# Patient Record
Sex: FEMALE | Race: WHITE | HISPANIC OR LATINO | Employment: OTHER | URBAN - METROPOLITAN AREA
[De-identification: names, ages, dates, MRNs, and addresses within clinical notes are randomized per-mention and may not be internally consistent; named-entity substitution may affect disease eponyms.]

---

## 2017-01-11 ENCOUNTER — APPOINTMENT (OUTPATIENT)
Dept: RADIOLOGY | Facility: MEDICAL CENTER | Age: 62
End: 2017-01-11
Payer: COMMERCIAL

## 2017-01-11 ENCOUNTER — APPOINTMENT (OUTPATIENT)
Dept: RADIOLOGY | Facility: MEDICAL CENTER | Age: 62
End: 2017-01-11
Attending: PHYSICIAN ASSISTANT
Payer: COMMERCIAL

## 2017-11-16 ENCOUNTER — OFFICE VISIT (OUTPATIENT)
Dept: URGENT CARE | Facility: PHYSICIAN GROUP | Age: 62
End: 2017-11-16
Payer: COMMERCIAL

## 2017-11-16 ENCOUNTER — HOSPITAL ENCOUNTER (OUTPATIENT)
Dept: RADIOLOGY | Facility: MEDICAL CENTER | Age: 62
End: 2017-11-16
Attending: NURSE PRACTITIONER
Payer: COMMERCIAL

## 2017-11-16 VITALS
TEMPERATURE: 98.5 F | HEIGHT: 64 IN | RESPIRATION RATE: 16 BRPM | SYSTOLIC BLOOD PRESSURE: 124 MMHG | HEART RATE: 79 BPM | OXYGEN SATURATION: 97 % | WEIGHT: 188 LBS | BODY MASS INDEX: 32.1 KG/M2 | DIASTOLIC BLOOD PRESSURE: 76 MMHG
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
## 2017-11-16 DIAGNOSIS — S69.91XA INJURY OF RIGHT WRIST, INITIAL ENCOUNTER: ICD-10-CM

## 2017-11-16 PROCEDURE — 73110 X-RAY EXAM OF WRIST: CPT | Mod: RT

## 2017-11-16 PROCEDURE — 99213 OFFICE O/P EST LOW 20 MIN: CPT | Performed by: NURSE PRACTITIONER

## 2017-11-16 ASSESSMENT — ENCOUNTER SYMPTOMS
SENSORY CHANGE: 0
CHILLS: 0
FEVER: 0
MYALGIAS: 1
WEAKNESS: 0
TINGLING: 0
FALLS: 1

## 2017-11-16 NOTE — PROGRESS NOTES
Subjective:      Giorgi Rodriguez is a 62 y.o. female who presents with Wrist Injury (right wrist pain and welling, slipped on water x today not work related)            HPI  Giorgi slipped and fell at restaurant 2 hrs ago due to rain. Posted area with sign of caution of 'slippery when wet''. No previous injury to right wrist but has had right shoulder injury from slip and fall. Denies numbness/tingling. Able to move all fingers and make a fist. Ice application and took 800 mg Ibuprofen at time of incident. Denies hitting head.    PMH:  has a past medical history of Arthritis; Heart abnormality; Heart burn; Hypertension; Infectious disease (2016); Insulin resistance (2016); Paralysis (CMS-HCC) (2016); PFO (patent foramen ovale); and Stroke (CMS-HCC) (2012).  MEDS:   Current Outpatient Prescriptions:   •  losartan (COZAAR) 100 MG Tab, Take 1 Tab by mouth every day., Disp: 90 Each, Rfl: 3  •  amlodipine (NORVASC) 10 MG Tab, Take 10 mg by mouth every day., Disp: , Rfl:   •  metformin (GLUCOPHAGE) 500 MG Tab, Take 500 mg by mouth every day., Disp: , Rfl:   •  ALOE VERA JUICE PO, Take  by mouth., Disp: , Rfl:   •  aspirin (ASA) 325 MG TABS, Take 325 mg by mouth every day., Disp: , Rfl:   •  Dulaglutide 1.5 MG/0.5ML Solution Pen-injector, Inject  as instructed every 7 days., Disp: , Rfl:   ALLERGIES:   Allergies   Allergen Reactions   • Codeine      vomit     SURGHX:   Past Surgical History:   Procedure Laterality Date   • SHOULDER ARTHROSCOPY W/ ROTATOR CUFF REPAIR Right 2/29/2016    Procedure: SHOULDER ARTHROSCOPY W/ ROTATOR CUFF REPAIR  W/DEBRIDEMENT;  Surgeon: Dejon Lawton M.D.;  Location: SURGERY [facility];  Service:    • SHOULDER DECOMPRESSION ARTHROSCOPIC Right 2/29/2016    Procedure: SHOULDER DECOMPRESSION ARTHROSCOPIC - SUBACROMIAL biceps tenotomy;  Surgeon: Dejon Lawton M.D.;  Location: [facility];  Service:    • RECOVERY  8/3/2012    Performed by MATEUSZ FERGUSON at  "SURGERY SAME DAY [facility] ORS   • ABDOMINAL HYSTERECTOMY TOTAL  1992    total abdominal hysterectomy   • BREAST BIOPSY  1992    hx of benign left breast bx.   • PRIMARY C SECTION  1986   • APPENDECTOMY  1969     SOCHX:  reports that she has never smoked. She has never used smokeless tobacco. She reports that she drinks alcohol. She reports that she does not use drugs.  FH: Family history was reviewed, no pertinent findings to report    Review of Systems   Constitutional: Negative for chills, fever and malaise/fatigue.   Musculoskeletal: Positive for falls, joint pain and myalgias.   Neurological: Negative for tingling, sensory change and weakness.   All other systems reviewed and are negative.         Objective:     /76   Pulse 79   Temp 36.9 °C (98.5 °F)   Resp 16   Ht 1.626 m (5' 4\")   Wt 85.3 kg (188 lb)   SpO2 97%   BMI 32.27 kg/m²      Physical Exam   Constitutional: She is oriented to person, place, and time. Vital signs are normal. She appears well-developed and well-nourished. She is active and cooperative.  Non-toxic appearance. She does not have a sickly appearance. She does not appear ill. No distress.   HENT:   Head: Normocephalic.   Eyes: Conjunctivae and EOM are normal. Pupils are equal, round, and reactive to light.   Neck: Normal range of motion.   Cardiovascular: Normal rate.    Pulmonary/Chest: Effort normal.   Musculoskeletal:        Right wrist: She exhibits decreased range of motion, tenderness, bony tenderness and swelling. She exhibits no effusion, no crepitus and no deformity.        Right forearm: She exhibits tenderness. She exhibits no bony tenderness, no swelling, no edema, no deformity and no laceration.        Arms:  Minimal swelling of right hand/wrist, TTP at base of right thumb and 1st digit metacarpal bones of dorsal aspect. Decreased ROM with upward flexion at this site as well as the ventral aspect of midline wrist joint. Some TTP of right forearm without swelling or " deformity.   Neurological: She is alert and oriented to person, place, and time.   Skin: Skin is warm and dry. She is not diaphoretic.   Vitals reviewed.    Right wrist xray:    FINDINGS:  No acute fracture or subluxation is seen.  Smoothly contoured 3 mm calcification overlies the dorsal carpus  Normal carpal relationships     MA applied velcro wrist splint  Assessment/Plan:     1. Injury of right wrist, initial encounter    - DX-WRIST-COMPLETE 3+ RIGHT; Future    2. Fall on same level from slipping, initial encounter    - DX-WRIST-COMPLETE 3+ RIGHT; Future    May take Ibuprofen prn for pain/swelling  May use cool compresses for swelling prn  May utilize RICE method prn  Avoid excessive weight lifting until muscle soreness in right hand/wrist decreases  May apply topical analgesics prn  Perform proper body mechanics with lifting, twisting, bending and reaching. Ask for assistance with heavy objects prn  Monitor for deformity, numbness/tingling in fingers, decreased ROM with lifting difficulty- need re-evaluation

## 2018-02-25 ENCOUNTER — TELEMEDICINE2 (OUTPATIENT)
Dept: URGENT CARE | Facility: PHYSICIAN GROUP | Age: 63
End: 2018-02-25

## 2018-02-25 VITALS — WEIGHT: 180 LBS | HEIGHT: 64 IN | BODY MASS INDEX: 30.73 KG/M2

## 2018-02-25 DIAGNOSIS — R05.3 CHRONIC COUGH: ICD-10-CM

## 2018-02-25 DIAGNOSIS — J06.9 URI WITH COUGH AND CONGESTION: ICD-10-CM

## 2018-02-25 PROCEDURE — 99214 OFFICE O/P EST MOD 30 MIN: CPT | Performed by: FAMILY MEDICINE

## 2018-02-25 RX ORDER — BENZONATATE 200 MG/1
200 CAPSULE ORAL 3 TIMES DAILY PRN
Qty: 30 CAP | Refills: 0 | Status: SHIPPED | OUTPATIENT
Start: 2018-02-25 | End: 2018-09-18

## 2018-02-28 NOTE — PROGRESS NOTES
"Chief Complaint   Patient presents with   • Cough     deep cough been taking over the counter medication not getting beter      CC:  cough        Cough  This is a new problem. The current episode started 2-3 wks ago. The problem has been unchanged. The problem occurs constantly. The cough is dry. Associated symptoms include : fatigue, but she denies any muscle aches, fever. Pertinent negatives include no   headaches, nausea, vomiting, diarrhea, sweats, weight loss or wheezing. Nothing aggravates the symptoms.  Patient has tried several OTC medications for the symptoms without relief. There is no history of asthma.        Past Medical History:   Diagnosis Date   • Infectious disease 2016    bronchitis   • Paralysis (CMS-HCC) 2016    facial paralysis x 3, Pt states\" may be related to possible lupus\"   • Insulin resistance 2016   • Stroke (CMS-HCC) 2012    TIA   • Arthritis    • Heart abnormality     patent foramen ovale   • Heart burn     joints, neck (buldging discs)   • Hypertension    • PFO (patent foramen ovale)          Social History   Substance Use Topics   • Smoking status: Never Smoker   • Smokeless tobacco: Never Used   • Alcohol use Yes      Comment: 1-2 glasses of wine 3-4 times a week         Current Outpatient Prescriptions on File Prior to Visit   Medication Sig Dispense Refill   • losartan (COZAAR) 100 MG Tab Take 1 Tab by mouth every day. 90 Each 3   • amlodipine (NORVASC) 10 MG Tab Take 10 mg by mouth every day.     • metformin (GLUCOPHAGE) 500 MG Tab Take 500 mg by mouth every day.     • ALOE VERA JUICE PO Take  by mouth.     • aspirin (ASA) 325 MG TABS Take 325 mg by mouth every day.     • Dulaglutide 1.5 MG/0.5ML Solution Pen-injector Inject  as instructed every 7 days.       No current facility-administered medications on file prior to visit.                     Review of Systems   Constitutional: Negative for fever and weight loss.   HENT: negative for otalgia  Cardiovascular - denies chest pain " "or dyspnea  Respiratory: Positive for cough.  .  Negative for wheezing.    Neurological: Negative for headaches.   GI - denies nausea, vomiting or diarrhea  Neuro - denies numbness or tingling.            Objective:     Height 1.626 m (5' 4\"), weight 81.6 kg (180 lb).    Physical Exam   Constitutional: patient is oriented to person, place, and time. Patient appears well-developed and well-nourished. No distress.      Psychiatric: patient  has a normal mood and affect.  behavior is normal.   Nursing note and vitals reviewed.              Assessment/Plan:       1.  cough   likely secondary to URI, but I advised the pt that given the limitations of the virtual visit, I advised her to come in to the clinic for evaluation.     She declined to come in, therefore will prescribe tessalon and reiterated that she should come to the clinic if sx are not improved in 3-4 days.       - benzonatate (TESSALON) 200 MG capsule; Take 1 Cap by mouth 3 times a day as needed for Cough.  Dispense: 30 Cap; Refill: 0     "

## 2018-05-25 DIAGNOSIS — I10 ESSENTIAL HYPERTENSION: ICD-10-CM

## 2018-05-25 RX ORDER — LOSARTAN POTASSIUM 100 MG/1
100 TABLET ORAL DAILY
Qty: 30 TAB | Refills: 0 | Status: SHIPPED | OUTPATIENT
Start: 2018-05-25 | End: 2018-06-20 | Stop reason: SDUPTHER

## 2018-05-25 NOTE — TELEPHONE ENCOUNTER
Adri Burkett, Med Ass't  Missy Nguyen R.N.             Okay I will prep it    Previous Messages      ----- Message -----   From: Missy Nguyen R.N.   Sent: 5/25/2018   2:26 PM   To: Adri Burkett Med Ass't   Subject: RE: Patient out of prescription for Losartan     Will send 30 day and will defer to PCP after that.     Thanks!     ----- Message -----   From: Adri Burkett Med Ass't   Sent: 5/25/2018   2:23 PM   To: Missy Nguyen R.N.   Subject: FW: Patient out of prescription for Losartan     LA's last notes state:   Return if symptoms worsen or fail to improve.   That was back in 2016     Would it be okay for me to inform the patient to get refills from PCP or okay to fill under LA?     ----- Message -----   From: Precious Henriquez   Sent: 5/25/2018   9:59 AM   To: Adri Burkett Med Ass't   Subject: Patient out of prescription for Losartan         Adri,     This is a patient of Dr Ai Conner's. She has been out of her prescription for Losartan 100 mg since Sunday and needs it called into [facility] in [place].

## 2018-08-23 ENCOUNTER — TELEPHONE (OUTPATIENT)
Dept: CARDIOLOGY | Facility: MEDICAL CENTER | Age: 63
End: 2018-08-23

## 2018-08-23 DIAGNOSIS — I10 ESSENTIAL HYPERTENSION: ICD-10-CM

## 2018-08-23 RX ORDER — LOSARTAN POTASSIUM 100 MG/1
100 TABLET ORAL DAILY
Qty: 30 TAB | Refills: 0 | Status: SHIPPED | OUTPATIENT
Start: 2018-08-23 | End: 2018-09-19 | Stop reason: SDUPTHER

## 2018-08-23 NOTE — TELEPHONE ENCOUNTER
"----- Message from Adri Burkett, Med Ass't sent at 8/23/2018  8:30 AM PDT -----  Regarding: refill request  Patient called requesting her losartan however her last appt in 2016 stated \"Return if symptoms worsen or fail to improve\"  So should patient continue to get her refills from the PCP? Or fill under cardiologist? Please call patient back to clarify  Thank you    ==========================================================================    Noted that pt is already scheduled to see Dr Conner 9/18/18.     Refill Rx for Losartan sent to [facility]                     "

## 2018-09-10 DIAGNOSIS — I10 ESSENTIAL HYPERTENSION: ICD-10-CM

## 2018-09-11 RX ORDER — AMLODIPINE BESYLATE 10 MG/1
10 TABLET ORAL DAILY
Qty: 30 TAB | Refills: 0 | OUTPATIENT
Start: 2018-09-11

## 2018-09-11 NOTE — TELEPHONE ENCOUNTER
Ai Conner M.D.  Missy Nguyen RNithinN.   Caller: Unspecified (Yesterday,  4:59 PM)             Hmm   I think I last saw her 11/2016   I cant refill these..     Let me know if that is a problem.      Attempted to call pt to notify, no answer, detailed vm left regarding above

## 2018-09-18 ENCOUNTER — OFFICE VISIT (OUTPATIENT)
Dept: CARDIOLOGY | Facility: MEDICAL CENTER | Age: 63
End: 2018-09-18
Payer: COMMERCIAL

## 2018-09-18 DIAGNOSIS — Q21.12 PFO (PATENT FORAMEN OVALE): ICD-10-CM

## 2018-09-18 PROCEDURE — 99214 OFFICE O/P EST MOD 30 MIN: CPT | Performed by: INTERNAL MEDICINE

## 2018-09-18 ASSESSMENT — ENCOUNTER SYMPTOMS
COUGH: 0
MEMORY LOSS: 0
HEARTBURN: 0
ABDOMINAL PAIN: 0
WEIGHT LOSS: 0
SPUTUM PRODUCTION: 0
DIZZINESS: 0
NAUSEA: 0
ORTHOPNEA: 0
SHORTNESS OF BREATH: 0
WHEEZING: 0

## 2018-09-19 ENCOUNTER — TELEPHONE (OUTPATIENT)
Dept: CARDIOLOGY | Facility: MEDICAL CENTER | Age: 63
End: 2018-09-19

## 2018-09-19 DIAGNOSIS — I10 ESSENTIAL HYPERTENSION: ICD-10-CM

## 2018-09-19 DIAGNOSIS — Q21.12 PFO (PATENT FORAMEN OVALE): ICD-10-CM

## 2018-09-19 DIAGNOSIS — E10.9 TYPE 1 DIABETES MELLITUS WITHOUT COMPLICATION (HCC): ICD-10-CM

## 2018-09-19 RX ORDER — LOSARTAN POTASSIUM 100 MG/1
100 TABLET ORAL DAILY
Qty: 90 TAB | Refills: 3 | Status: SHIPPED | OUTPATIENT
Start: 2018-09-19 | End: 2019-10-12 | Stop reason: SDUPTHER

## 2018-09-19 NOTE — TELEPHONE ENCOUNTER
Ai Conner M.D.  Missy Nguyen R.N.             The injectable     Thx!!    Previous Messages      ----- Message -----   From: Missy Nguyen R.N.   Sent: 9/18/2018   5:12 PM   To: Ai Conner M.D.     Hi Dr Conner,     Did you mean the Metformin or other meds?     Thank You!     ----- Message -----   From: Ai Conner M.D.   Sent: 9/18/2018   4:34 PM   To: Missy Nguyen R.N.     Can you help me renew her diabetes medicine?  She needs about 6 months worth, find by me I just saw her, I just could not figure out how to do it.  [facility]           Refill Rx for Dulaglutide sent to "[facility] pharm

## 2018-09-19 NOTE — PROGRESS NOTES
"Subjective:   Giorgi Rodriguez is a 61 y.o. female who presents today in follow-up also has hypertension in regards to her history of strokelike symptoms in 2012 with a noted PFO.    Was in [place] at a golf match.  Had a few seconds where she lost control of one side of her face.  Transient.  Some confusion.  Thinks she might have thrown a clot.  Reminded her of 2012.  Did not call us did not seek ER help back to normal.       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  No limitations in activity.  Compliant on her medication        Past Medical History:   Diagnosis Date   • Arthritis    • Heart abnormality     patent foramen ovale   • Heart burn     joints, neck (buldging discs)   • Hypertension    • Infectious disease 2016    bronchitis   • Insulin resistance 2016   • Paralysis (HCC) 2016    facial paralysis x 3, Pt states\" may be related to possible lupus\"   • PFO (patent foramen ovale)    • Stroke (HCC) 2012    TIA     Past Surgical History:   Procedure Laterality Date   • SHOULDER ARTHROSCOPY W/ ROTATOR CUFF REPAIR Right 2/29/2016    Procedure: SHOULDER ARTHROSCOPY W/ ROTATOR CUFF REPAIR  W/DEBRIDEMENT;  Surgeon: Dejon Lawton M.D.;  Location: [facility];  Service:    • SHOULDER DECOMPRESSION ARTHROSCOPIC Right 2/29/2016    Procedure: SHOULDER DECOMPRESSION ARTHROSCOPIC - SUBACROMIAL biceps tenotomy;  Surgeon: Dejon Lawton M.D.;  Location: [facility];  Service:    • RECOVERY  8/3/2012    Performed by MATEUSZ FERGUSON at SURGERY SAME DAY [facility] ORS   • ABDOMINAL HYSTERECTOMY TOTAL  1992    total abdominal hysterectomy   • BREAST BIOPSY  1992    hx of benign left breast bx.   • PRIMARY C SECTION  1986   • APPENDECTOMY  1969     Family History   Problem Relation Age of Onset   • Clotting Disorder Mother    • Heart Attack Father    • Heart Disease Father    • Lung Disease Father    • Cancer Paternal Aunt         ovarian ca   • Cancer " Paternal Grandmother         cervical   • Cancer Paternal Aunt         ovarian ca     History   Smoking Status   • Never Smoker   Smokeless Tobacco   • Never Used     Allergies   Allergen Reactions   • Codeine      vomit     Outpatient Encounter Prescriptions as of 9/18/2018   Medication Sig Dispense Refill   • losartan (COZAAR) 100 MG Tab Take 1 Tab by mouth every day. Please contact PCP for further refills. 30 Tab 0   • metformin (GLUCOPHAGE) 500 MG Tab Take 500 mg by mouth every day.     • ALOE VERA JUICE PO Take  by mouth.     • aspirin (ASA) 325 MG TABS Take 325 mg by mouth every day.     • [DISCONTINUED] benzonatate (TESSALON) 200 MG capsule Take 1 Cap by mouth 3 times a day as needed for Cough. (Patient not taking: Reported on 9/18/2018) 30 Cap 0   • Dulaglutide 1.5 MG/0.5ML Solution Pen-injector Inject  as instructed every 7 days.     • amlodipine (NORVASC) 10 MG Tab Take 10 mg by mouth every day.       No facility-administered encounter medications on file as of 9/18/2018.      Review of Systems   Constitutional: Negative for malaise/fatigue and weight loss.   Respiratory: Negative for cough, sputum production, shortness of breath and wheezing.    Cardiovascular: Negative for chest pain and orthopnea.   Gastrointestinal: Negative for abdominal pain, heartburn and nausea.   Neurological: Negative for dizziness.   Psychiatric/Behavioral: Negative for memory loss.   All other systems reviewed and are negative.       Objective:   There were no vitals taken for this visit.    Physical Exam   Constitutional: She is oriented to person, place, and time. She appears well-developed and well-nourished.   Patient seen and examined again today, changes are noted   HENT:   Head: Normocephalic and atraumatic.   Mouth/Throat: Mucous membranes are normal.   Eyes: Pupils are equal, round, and reactive to light. EOM are normal.   Neck: No JVD present. No thyroid mass and no thyromegaly present.   Cardiovascular: Normal rate  and regular rhythm.  Exam reveals no gallop.    No murmur heard.  Pulmonary/Chest: Effort normal and breath sounds normal. She exhibits no tenderness.   Abdominal: Bowel sounds are normal. She exhibits no distension.   Musculoskeletal: Normal range of motion. She exhibits no edema or tenderness.   Neurological: She is alert and oriented to person, place, and time. She has normal strength. She displays no tremor. No cranial nerve deficit.   Skin: Skin is warm and dry. No rash noted.   Psychiatric: She has a normal mood and affect. Her behavior is normal.   Vitals reviewed.      Assessment:     1. PFO (patent foramen ovale), noted on echo 2012  Echocardiogram Comp w/o Cont       Medical Decision Making:  Today's Assessment / Status / Plan:     Strokelike symptoms  We talked about getting an MRI or emergency procedures.  She is not interested and understands risks  Continue aspirin  Repeat echo to look at right heart, I do not think we need a bubble study if she has significant problems again, I would recommend  An MRI and anticoagulation until proven otherwise    I would also recommend her seeing my partner in regards to closure    In addition, her primary care just left the area.  We talked about diabetes and I took the liberty of renewing her medications for her    She voices understanding she is aware of the risks.  We talked at length about the complexity of this problem and follow-up    Physical Exam   Constitutional: She is oriented to person, place, and time. She appears well-developed and well-nourished.   Patient seen and examined again today, changes are noted   HENT:   Head: Normocephalic and atraumatic.   Mouth/Throat: Mucous membranes are normal.   Eyes: Pupils are equal, round, and reactive to light. EOM are normal.   Neck: No JVD present. No thyroid mass and no thyromegaly present.   Cardiovascular: Normal rate and regular rhythm.  Exam reveals no gallop.    No murmur heard.  Pulmonary/Chest: Effort  normal and breath sounds normal. She exhibits no tenderness.   Abdominal: Bowel sounds are normal. She exhibits no distension.   Musculoskeletal: Normal range of motion. She exhibits no edema or tenderness.   Neurological: She is alert and oriented to person, place, and time. She has normal strength. She displays no tremor. No cranial nerve deficit.   Skin: Skin is warm and dry. No rash noted.   Psychiatric: She has a normal mood and affect. Her behavior is normal.   Vitals reviewed.

## 2018-09-28 ENCOUNTER — APPOINTMENT (OUTPATIENT)
Dept: INTERNAL MEDICINE | Facility: MEDICAL CENTER | Age: 63
End: 2018-09-28
Payer: COMMERCIAL

## 2018-10-25 ENCOUNTER — HOSPITAL ENCOUNTER (OUTPATIENT)
Dept: CARDIOLOGY | Facility: MEDICAL CENTER | Age: 63
End: 2018-10-25
Attending: INTERNAL MEDICINE
Payer: COMMERCIAL

## 2018-10-25 DIAGNOSIS — Q21.12 PFO (PATENT FORAMEN OVALE): ICD-10-CM

## 2018-10-25 PROCEDURE — 93306 TTE W/DOPPLER COMPLETE: CPT

## 2018-10-25 PROCEDURE — 93306 TTE W/DOPPLER COMPLETE: CPT | Mod: 26 | Performed by: INTERNAL MEDICINE

## 2018-10-26 LAB
LV EJECT FRACT  99904: 65
LV EJECT FRACT MOD 2C 99903: 69.96
LV EJECT FRACT MOD 4C 99902: 68.56
LV EJECT FRACT MOD BP 99901: 69.86

## 2018-10-29 ENCOUNTER — TELEPHONE (OUTPATIENT)
Dept: CARDIOLOGY | Facility: MEDICAL CENTER | Age: 63
End: 2018-10-29

## 2018-10-29 NOTE — LETTER
October 30, 2018        Giorgi Whitlock  [address] [place] [address]        Dear Giorgi:    Dr Ai Conner reviewed your recent Echocardiogram and she said:    Strong heart on echocardiogram - still see the PFO - no changes. She is recommending you to see one of our specialist in PFO, his name is Dr Dash Barragan, please contact our scheduling at [phone] to schedule an appointment with him.         If you have any questions or concerns, please don't hesitate to call our office, [phone]        Sincerely,    Missy TATE/Dr Ai Conner

## 2018-10-30 NOTE — TELEPHONE ENCOUNTER
Second call attempted, no answer, left vm to call back     Letter mailed to pt    Task sent to  to please schedule pt w/ Dr Barragan.

## 2018-10-30 NOTE — TELEPHONE ENCOUNTER
Telma Erickson, Med Ass't  Missy Nguyen R.N.             His schedule only goes out till the end of December and he has nothing.    Previous Messages      ----- Message -----   From: Missy Nguyen R.N.   Sent: 10/30/2018   2:31 PM   To: Telma Erickson Med Ass't     Next avail w/ AK, needs to see for possible PFO closure.     Thanks!     ----- Message -----   From: Telma Erickson, Med Ass't   Sent: 10/30/2018   1:57 PM   To: Missy Nguyen R.N.     She just saw [facility] in Sept so when does this need to be scheduled for?   ----- Message -----   From: Missy Nguyen R.N.   Sent: 10/30/2018   8:30 AM   To: [facility] Schedulers Pool     Please schedule pt w/ AK per [facility].     Dx PFO     Thanks!               Pt called back, discussed Echo result per Dr Conner and her recommendations, informed pt that Dr Barragan don't have opening but will contact her as soon as his schedule opens next year, pt appreciative of help and verbalizes understanding

## 2018-10-30 NOTE — TELEPHONE ENCOUNTER
EC-ECHOCARDIOGRAM COMPLETE W/O CONT   Notes recorded by Ai Conner M.D. on 10/26/2018 at 11:13 PM PDT  Strong heart on echo - still see the PFO - no changes  Can we make sure she has a date with Dr SALOMON - re PFO?     Attempted to call pt, no answer, left vm to call back

## 2019-01-08 ENCOUNTER — OFFICE VISIT (OUTPATIENT)
Dept: CARDIOLOGY | Facility: MEDICAL CENTER | Age: 64
End: 2019-01-08
Payer: COMMERCIAL

## 2019-01-08 VITALS
HEIGHT: 64 IN | BODY MASS INDEX: 33.87 KG/M2 | HEART RATE: 84 BPM | OXYGEN SATURATION: 95 % | DIASTOLIC BLOOD PRESSURE: 98 MMHG | WEIGHT: 198.41 LBS | SYSTOLIC BLOOD PRESSURE: 138 MMHG

## 2019-01-08 DIAGNOSIS — Q21.12 PFO (PATENT FORAMEN OVALE): ICD-10-CM

## 2019-01-08 PROCEDURE — 99214 OFFICE O/P EST MOD 30 MIN: CPT | Performed by: INTERNAL MEDICINE

## 2019-01-08 NOTE — LETTER
"     [facility]   [address]  [place] [address]  Phone: [phone]  Fax: [phone]              Giorgi Whitolck  1955    Encounter Date: 1/8/2019    Dash Barragan M.D.          PROGRESS NOTE:      Cardiology Initial Consultation Note    Date of note:    1/9/2019    Primary Care Provider: Pcp Pt States None  Referring Provider: Ai Conner M.*     Patient Name: Giorgi Baez   YOB: 1955  MRN:              [id number]    Chief Complaint: PFO    Giorgi Whitlock is a 63 y.o. female referred by Dr. Conner to discuss about patent foramen ovale.  She has a history of transient ischemic attack in 2012 where she had arm weakness/numbness for a few hours.  MRI of the head was unremarkable at that time.  Last year she had another episode in [place], she felt dizzy and loss of balance for a few minutes, denied any focal weakness numbness.  She did not go to emergency room at that time.  Her prior evaluation with Holter monitoring was negative for atrial fibrillation.  Echocardiogram showed patent foramen ovale.    ROS  Pertinent positives are facial nerve paralysis, back pain, fatigue.  All other systems reviewed and discussed using a comprehensive questionnaire and are negative.     Past medical history, family history, social history, allergies and labs are reviewed and updated as needed as documented below.    Past Medical History:   Diagnosis Date   • Arthritis    • Heart abnormality     patent foramen ovale   • Heart burn     joints, neck (buldging discs)   • Hypertension    • Infectious disease 2016    bronchitis   • Insulin resistance 2016   • Paralysis (HCC) 2016    facial paralysis x 3, Pt states\" may be related to possible lupus\"   • PFO (patent foramen ovale)    • Stroke (HCC) 2012    TIA         Past Surgical History:   Procedure Laterality Date   • SHOULDER ARTHROSCOPY W/ ROTATOR " CUFF REPAIR Right 2/29/2016    Procedure: SHOULDER ARTHROSCOPY W/ ROTATOR CUFF REPAIR  W/DEBRIDEMENT;  Surgeon: Dejon Lawton M.D.;  Location: SURGERY [facility];  Service:    • SHOULDER DECOMPRESSION ARTHROSCOPIC Right 2/29/2016    Procedure: SHOULDER DECOMPRESSION ARTHROSCOPIC - SUBACROMIAL biceps tenotomy;  Surgeon: Dejon Lawton M.D.;  Location: SURGERY [facility];  Service:    • RECOVERY  8/3/2012    Performed by SURGERY, RECOVERYONGUSTABO at SURGERY SAME DAY [facility]   • ABDOMINAL HYSTERECTOMY TOTAL  1992    total abdominal hysterectomy   • BREAST BIOPSY  1992    hx of benign left breast bx.   • PRIMARY C SECTION  1986   • APPENDECTOMY  1969         Current Outpatient Prescriptions   Medication Sig Dispense Refill   • Dulaglutide 1.5 MG/0.5ML Solution Pen-injector Inject 1 PEN as instructed every 7 days. 4 PEN 6   • losartan (COZAAR) 100 MG Tab Take 1 Tab by mouth every day. 90 Tab 3   • ALOE VERA JUICE PO Take  by mouth.     • aspirin (ASA) 325 MG TABS Take 325 mg by mouth every day.     • amlodipine (NORVASC) 10 MG Tab Take 10 mg by mouth every day.     • metformin (GLUCOPHAGE) 500 MG Tab Take 500 mg by mouth every day.       No current facility-administered medications for this visit.          Allergies   Allergen Reactions   • Codeine      vomit         Family History   Problem Relation Age of Onset   • Clotting Disorder Mother    • Heart Attack Father    • Heart Disease Father    • Lung Disease Father    • Cancer Paternal Aunt         ovarian ca   • Cancer Paternal Grandmother         cervical   • Cancer Paternal Aunt         ovarian ca         Social History     Social History   • Marital status:      Spouse name: N/A   • Number of children: N/A   • Years of education: N/A     Occupational History   • Not on file.     Social History Main Topics   • Smoking status: Never Smoker   • Smokeless tobacco: Never Used   • Alcohol use Yes      Comment: 1-2 glasses of wine 3-4 times a week    "  • Drug use: No   • Sexual activity: No     Other Topics Concern   • Not on file     Social History Narrative   • No narrative on file         Physical Exam:  Ambulatory Vitals  Blood pressure 138/98, pulse 84, height 1.626 m (5' 4\"), weight 90 kg (198 lb 6.6 oz), SpO2 95 %, not currently breastfeeding.   Oxygen Therapy:     BP Readings from Last 4 Encounters:   01/08/19 138/98   11/16/17 124/76   11/15/16 120/80   11/02/16 128/76       Weight/BMI: Body mass index is 34.06 kg/m².  Wt Readings from Last 4 Encounters:   01/08/19 90 kg (198 lb 6.6 oz)   02/25/18 81.6 kg (180 lb)   11/16/17 85.3 kg (188 lb)   11/15/16 83.5 kg (184 lb)         General: Well appearing and in no apparent distress  Head: atrumatic  Eyes: No conjunctival pallor   ENT: normal external appearance of nose and ears  Neck: JVD absent, carotid bruits absent  Lungs: respiratory sounds  normal, additional breath sounds absent  Heart: Regular rhythm,   No palpable thrills on palpation, murmurs absent, no rubs,   Lower extremity edema absent.   Pedal pulses normal  Abdomen: soft, non tender, non distended.  Extremities/MSK: no clubbing, no cyanosis  Neurological: normal orientation, Gait normal   Psychiatric: Appropriate affect, intact judgement and insight  Skin: Warm extremities        Lab Data Review:  Lab Results   Component Value Date/Time    CHOLSTRLTOT 191 10/18/2013 09:31 AM    CHOLSTRLTOT 198 06/14/2012 10:00 AM     (H) 10/18/2013 09:31 AM     06/14/2012 10:00 AM    HDL 61 10/18/2013 09:31 AM    HDL 74 06/14/2012 10:00 AM    TRIGLYCERIDE 107 10/18/2013 09:31 AM    TRIGLYCERIDE 99 06/14/2012 10:00 AM       Lab Results   Component Value Date/Time    SODIUM 136 11/02/2016 08:30 PM    POTASSIUM 3.6 11/02/2016 08:30 PM    CHLORIDE 102 11/02/2016 08:30 PM    CO2 25 11/02/2016 08:30 PM    GLUCOSE 91 11/02/2016 08:30 PM    BUN 14 11/02/2016 08:30 PM    CREATININE 0.53 11/02/2016 08:30 PM    BUNCREATRAT 25 (H) 10/18/2013 09:31 AM "     Lab Results   Component Value Date/Time    ALKPHOSPHAT 92 2016 08:30 PM    ASTSGOT 17 2016 08:30 PM    ALTSGPT 8 2016 08:30 PM    TBILIRUBIN 0.9 2016 08:30 PM      Lab Results   Component Value Date/Time    WBC 8.0 2016 08:30 PM     No components found for: HBGA1C  No components found for: TROPONIN  No components found for: BNP      Cardiac Imaging and Procedures Review:    EKG dated 2016 : My personal interpretation is  Sinus rhythm.    Echo dated 10/25/2018:   Technically difficult.  Left ventricular ejection fraction is visually estimated to be 65%.  Normal inferior vena cava size and inspiratory collapse.  Patent   foramen ovale noted with Doppler.  Estimated right ventricular systolic   pressure is at least 30 mmHg.      Medical Decision Makin. Patent foramen Ovale  2.  Hypertension  3. ?  TIA    Patient symptoms were vague except her episode in  was concerning for TIA.  However there is no evidence of stroke on the MRI scan.  I spent a great deal of time explaining indications for PFO closure.  With not very clear symptoms and no evidence of stroke on the MRI, I am not certain closing PFO would be beneficial.  I expressed this with the patient, she agrees and want to continue to watch at this time.  If she has recurrent TIA/stroke we will close the PFO.      This note was dictated using Dragon speech recognition software.    Dash ENRIQUEZ  Interventional cardiologist  [facility]  [facility], [address]  [address] [place] [address], [facility] [address]  [place] [address]  Phone: [phone]  Fax: [phone]                    Ai Conner M.D.  [address]  [address]  [place] [address]  VIA In Basket

## 2019-01-09 NOTE — PROGRESS NOTES
"    Cardiology Initial Consultation Note    Date of note:    1/9/2019    Primary Care Provider: Pcp Pt States None  Referring Provider: Ai Conner M.*     Patient Name: Giorgi Baez   YOB: 1955  MRN:              [id number]    Chief Complaint: PFO    Giorgi Whitlock is a 63 y.o. female referred by Dr. Conner to discuss about patent foramen ovale.  She has a history of transient ischemic attack in 2012 where she had arm weakness/numbness for a few hours.  MRI of the head was unremarkable at that time.  Last year she had another episode in [place], she felt dizzy and loss of balance for a few minutes, denied any focal weakness numbness.  She did not go to emergency room at that time.  Her prior evaluation with Holter monitoring was negative for atrial fibrillation.  Echocardiogram showed patent foramen ovale.    ROS  Pertinent positives are facial nerve paralysis, back pain, fatigue.  All other systems reviewed and discussed using a comprehensive questionnaire and are negative.     Past medical history, family history, social history, allergies and labs are reviewed and updated as needed as documented below.    Past Medical History:   Diagnosis Date   • Arthritis    • Heart abnormality     patent foramen ovale   • Heart burn     joints, neck (buldging discs)   • Hypertension    • Infectious disease 2016    bronchitis   • Insulin resistance 2016   • Paralysis (HCC) 2016    facial paralysis x 3, Pt states\" may be related to possible lupus\"   • PFO (patent foramen ovale)    • Stroke (HCC) 2012    TIA         Past Surgical History:   Procedure Laterality Date   • SHOULDER ARTHROSCOPY W/ ROTATOR CUFF REPAIR Right 2/29/2016    Procedure: SHOULDER ARTHROSCOPY W/ ROTATOR CUFF REPAIR  W/DEBRIDEMENT;  Surgeon: Dejon Lawton M.D.;  Location: SURGERY [facility];  Service:    • SHOULDER DECOMPRESSION ARTHROSCOPIC Right 2/29/2016    Procedure: SHOULDER DECOMPRESSION " "ARTHROSCOPIC - SUBACROMIAL biceps tenotomy;  Surgeon: Dejon Lawton M.D.;  Location: SURGERY [facility];  Service:    • RECOVERY  8/3/2012    Performed by SURGERY, RECOVERY[facility]USTABO at SURGERY SAME DAY [facility]   • ABDOMINAL HYSTERECTOMY TOTAL  1992    total abdominal hysterectomy   • BREAST BIOPSY  1992    hx of benign left breast bx.   • PRIMARY C SECTION  1986   • APPENDECTOMY  1969         Current Outpatient Prescriptions   Medication Sig Dispense Refill   • Dulaglutide 1.5 MG/0.5ML Solution Pen-injector Inject 1 PEN as instructed every 7 days. 4 PEN 6   • losartan (COZAAR) 100 MG Tab Take 1 Tab by mouth every day. 90 Tab 3   • ALOE VERA JUICE PO Take  by mouth.     • aspirin (ASA) 325 MG TABS Take 325 mg by mouth every day.     • amlodipine (NORVASC) 10 MG Tab Take 10 mg by mouth every day.     • metformin (GLUCOPHAGE) 500 MG Tab Take 500 mg by mouth every day.       No current facility-administered medications for this visit.          Allergies   Allergen Reactions   • Codeine      vomit         Family History   Problem Relation Age of Onset   • Clotting Disorder Mother    • Heart Attack Father    • Heart Disease Father    • Lung Disease Father    • Cancer Paternal Aunt         ovarian ca   • Cancer Paternal Grandmother         cervical   • Cancer Paternal Aunt         ovarian ca         Social History     Social History   • Marital status:      Spouse name: N/A   • Number of children: N/A   • Years of education: N/A     Occupational History   • Not on file.     Social History Main Topics   • Smoking status: Never Smoker   • Smokeless tobacco: Never Used   • Alcohol use Yes      Comment: 1-2 glasses of wine 3-4 times a week   • Drug use: No   • Sexual activity: No     Other Topics Concern   • Not on file     Social History Narrative   • No narrative on file         Physical Exam:  Ambulatory Vitals  Blood pressure 138/98, pulse 84, height 1.626 m (5' 4\"), weight 90 kg (198 lb 6.6 oz), SpO2 95 %, " not currently breastfeeding.   Oxygen Therapy:     BP Readings from Last 4 Encounters:   01/08/19 138/98   11/16/17 124/76   11/15/16 120/80   11/02/16 128/76       Weight/BMI: Body mass index is 34.06 kg/m².  Wt Readings from Last 4 Encounters:   01/08/19 90 kg (198 lb 6.6 oz)   02/25/18 81.6 kg (180 lb)   11/16/17 85.3 kg (188 lb)   11/15/16 83.5 kg (184 lb)         General: Well appearing and in no apparent distress  Head: atrumatic  Eyes: No conjunctival pallor   ENT: normal external appearance of nose and ears  Neck: JVD absent, carotid bruits absent  Lungs: respiratory sounds  normal, additional breath sounds absent  Heart: Regular rhythm,   No palpable thrills on palpation, murmurs absent, no rubs,   Lower extremity edema absent.   Pedal pulses normal  Abdomen: soft, non tender, non distended.  Extremities/MSK: no clubbing, no cyanosis  Neurological: normal orientation, Gait normal   Psychiatric: Appropriate affect, intact judgement and insight  Skin: Warm extremities        Lab Data Review:  Lab Results   Component Value Date/Time    CHOLSTRLTOT 191 10/18/2013 09:31 AM    CHOLSTRLTOT 198 06/14/2012 10:00 AM     (H) 10/18/2013 09:31 AM     06/14/2012 10:00 AM    HDL 61 10/18/2013 09:31 AM    HDL 74 06/14/2012 10:00 AM    TRIGLYCERIDE 107 10/18/2013 09:31 AM    TRIGLYCERIDE 99 06/14/2012 10:00 AM       Lab Results   Component Value Date/Time    SODIUM 136 11/02/2016 08:30 PM    POTASSIUM 3.6 11/02/2016 08:30 PM    CHLORIDE 102 11/02/2016 08:30 PM    CO2 25 11/02/2016 08:30 PM    GLUCOSE 91 11/02/2016 08:30 PM    BUN 14 11/02/2016 08:30 PM    CREATININE 0.53 11/02/2016 08:30 PM    BUNCREATRAT 25 (H) 10/18/2013 09:31 AM     Lab Results   Component Value Date/Time    ALKPHOSPHAT 92 11/02/2016 08:30 PM    ASTSGOT 17 11/02/2016 08:30 PM    ALTSGPT 8 11/02/2016 08:30 PM    TBILIRUBIN 0.9 11/02/2016 08:30 PM      Lab Results   Component Value Date/Time    WBC 8.0 11/02/2016 08:30 PM     No components  found for: HBGA1C  No components found for: TROPONIN  No components found for: BNP      Cardiac Imaging and Procedures Review:    EKG dated 2016 : My personal interpretation is  Sinus rhythm.    Echo dated 10/25/2018:   Technically difficult.  Left ventricular ejection fraction is visually estimated to be 65%.  Normal inferior vena cava size and inspiratory collapse.  Patent   foramen ovale noted with Doppler.  Estimated right ventricular systolic   pressure is at least 30 mmHg.      Medical Decision Makin. Patent foramen Ovale  2.  Hypertension  3. ?  TIA    Patient symptoms were vague except her episode in  was concerning for TIA.  However there is no evidence of stroke on the MRI scan.  I spent a great deal of time explaining indications for PFO closure.  With not very clear symptoms and no evidence of stroke on the MRI, I am not certain closing PFO would be beneficial.  I expressed this with the patient, she agrees and want to continue to watch at this time.  If she has recurrent TIA/stroke we will close the PFO.      This note was dictated using Dragon speech recognition software.    Dash ENRIQUEZ  Interventional cardiologist  [facility]  [place] [facility], [address].  [address] [address]  Phone: [phone]  Fax: [phone]

## 2019-02-04 ENCOUNTER — TELEPHONE (OUTPATIENT)
Dept: CARDIOLOGY | Facility: MEDICAL CENTER | Age: 64
End: 2019-02-04

## 2019-02-05 NOTE — TELEPHONE ENCOUNTER
PAR sent to plan:  Giorgi Whitlock (Key: [id number])   Rx #: [id number]   Trulicity 1.5MG/0.5ML SC SOPN   Form  [employer] Electronic PA Form   Created   45 minutes ago   Sent to Plan   2 minutes ago   Determination   Wait for Questions  [employer] typically responds with questions in less than 15 minutes, but may take up to 24 hours.

## 2019-02-19 ENCOUNTER — TELEPHONE (OUTPATIENT)
Dept: CARDIOLOGY | Facility: MEDICAL CENTER | Age: 64
End: 2019-02-19

## 2019-02-19 NOTE — TELEPHONE ENCOUNTER
PA/appeal sent to patient's plan:  Giorgi Whitlock (Key: [id number])   Trulicity 1.5MG/0.5ML pen-injectors   Form  Drug Appeal Form   Created   3 days ago   Sent to Plan   now   Downloaded   now   Determination   Wait for Determination  Please wait for the plan to return a determination.

## 2019-05-11 ENCOUNTER — OFFICE VISIT (OUTPATIENT)
Dept: URGENT CARE | Facility: PHYSICIAN GROUP | Age: 64
End: 2019-05-11
Payer: COMMERCIAL

## 2019-05-11 VITALS
RESPIRATION RATE: 14 BRPM | SYSTOLIC BLOOD PRESSURE: 122 MMHG | OXYGEN SATURATION: 100 % | TEMPERATURE: 97.9 F | HEIGHT: 64 IN | WEIGHT: 200 LBS | HEART RATE: 69 BPM | DIASTOLIC BLOOD PRESSURE: 80 MMHG | BODY MASS INDEX: 34.15 KG/M2

## 2019-05-11 DIAGNOSIS — S46.912A LEFT SHOULDER STRAIN, INITIAL ENCOUNTER: ICD-10-CM

## 2019-05-11 PROCEDURE — 99214 OFFICE O/P EST MOD 30 MIN: CPT | Performed by: PHYSICIAN ASSISTANT

## 2019-05-11 NOTE — PROGRESS NOTES
Subjective:   Giorgi Whitlock is a 63 y.o. female who presents for Shoulder Injury (left shoulder injury when walking dog )        Patient complains of left shoulder pain x1 day.  She was walking her [place] when the dog suddenly lunged, pulling her shoulder and arm forward.  She states that she felt a pop.  She endorses restricted range of motion and some numbness and tingling on the outside of her arm. Denies prior injury to the left shoulder/arm.  However she does have history of a right rotator cuff tear with subsequent surgical repair.      Shoulder Injury    The incident occurred at home. The left shoulder is affected. The incident occurred 1 to 3 hours ago. Injury mechanism: forward pulling. The quality of the pain is described as aching. The pain radiates to the left arm. The pain is moderate. Associated symptoms include muscle weakness (mild). Pertinent negatives include no chest pain, numbness or tingling. The symptoms are aggravated by movement and overhead lifting. She has tried NSAIDs for the symptoms. The treatment provided moderate relief.     Review of Systems   Cardiovascular: Negative for chest pain.   Neurological: Negative for tingling and numbness.       PMH:  has a past medical history of Arthritis; Heart abnormality; Heart burn; Hypertension; Infectious disease (2016); Insulin resistance (2016); Paralysis ([facility]) (2016); PFO (patent foramen ovale); and Stroke ([facility]) (2012).  MEDS:   Current Outpatient Prescriptions:   •  Diclofenac Sodium 1 % Gel, Apply 2 g to skin as directed 2 Times a Day., Disp: 1 Tube, Rfl: 0  •  losartan (COZAAR) 100 MG Tab, Take 1 Tab by mouth every day., Disp: 90 Tab, Rfl: 3  •  aspirin (ASA) 325 MG TABS, Take 325 mg by mouth every day., Disp: , Rfl:   •  Dulaglutide 1.5 MG/0.5ML Solution Pen-injector, Inject 1 PEN as instructed every 7 days., Disp: 4 PEN, Rfl: 6  •  amlodipine (NORVASC) 10 MG Tab, Take 10 mg by mouth every day., Disp: , Rfl:   •   "metformin (GLUCOPHAGE) 500 MG Tab, Take 500 mg by mouth every day., Disp: , Rfl:   •  ALOE VERA JUICE PO, Take  by mouth., Disp: , Rfl:   ALLERGIES:   Allergies   Allergen Reactions   • Codeine      vomit     SURGHX:   Past Surgical History:   Procedure Laterality Date   • SHOULDER ARTHROSCOPY W/ ROTATOR CUFF REPAIR Right 2/29/2016    Procedure: SHOULDER ARTHROSCOPY W/ ROTATOR CUFF REPAIR  W/DEBRIDEMENT;  Surgeon: Dejon Lawton M.D.;  Location: SURGERY [facility];  Service:    • SHOULDER DECOMPRESSION ARTHROSCOPIC Right 2/29/2016    Procedure: SHOULDER DECOMPRESSION ARTHROSCOPIC - SUBACROMIAL biceps tenotomy;  Surgeon: Dejon Lawton M.D.;  Location: SURGERY [facility];  Service:    • RECOVERY  8/3/2012    Performed by MATEUSZ FERGUSON at SURGERY SAME DAY [facility] ORS   • ABDOMINAL HYSTERECTOMY TOTAL  1992    total abdominal hysterectomy   • BREAST BIOPSY  1992    hx of benign left breast bx.   • PRIMARY C SECTION  1986   • APPENDECTOMY  1969     SOCHX:  reports that she has never smoked. She has never used smokeless tobacco. She reports that she drinks alcohol. She reports that she does not use drugs.  FH: Family history was reviewed, no pertinent findings to report   Objective:   /80   Pulse 69   Temp 36.6 °C (97.9 °F) (Temporal)   Resp 14   Ht 1.626 m (5' 4\")   Wt 90.7 kg (200 lb)   SpO2 100%   BMI 34.33 kg/m²   Physical Exam   Constitutional: She is oriented to person, place, and time. She appears well-developed and well-nourished.  Non-toxic appearance. No distress.   HENT:   Head: Normocephalic and atraumatic.   Right Ear: External ear normal.   Left Ear: External ear normal.   Nose: Nose normal.   Neck: Neck supple.   Cardiovascular:   Pulses:       Radial pulses are 2+ on the left side.   Pulmonary/Chest: Effort normal. No respiratory distress.   Musculoskeletal:   Left shoulder: Flexion, extension, adduction within normal limits.  Abduction to 150 degrees-limited by pain.  " Posterior GHJ tender with deep palpation.  Clavicle, AC joint, scapula, proximal humerus are nontender.  Superior aspect of the left trapezius is tender to palpation with palpable spasm.  Negative empty can.  Negative speeds.  Left shoulder strength 4 out of 5.  Left elbow strength 5 out of 5.  Neurovascularly intact distally.   Neurological: She is alert and oriented to person, place, and time.   Skin: Skin is warm and dry. Capillary refill takes less than 2 seconds.   Psychiatric: She has a normal mood and affect. Her speech is normal and behavior is normal. Judgment and thought content normal. Cognition and memory are normal.   Vitals reviewed.        Assessment/Plan:   1. Left shoulder strain, initial encounter  - Diclofenac Sodium 1 % Gel; Apply 2 g to skin as directed 2 Times a Day.  Dispense: 1 Tube; Refill: 0  - REFERRAL TO FOLLOW-UP WITH PRIMARY CARE     No red flag symptoms.  No bony tenderness. Radiological imaging not indicated at this time.    Patient instructed to rest and avoid aggravating activities.  Apply warm, damp heat to the affected area and perform gentle stretches as instructed in clinic.  As symptoms improve patient may increase activity.    Start ibuprofen 400-600 mg 3 times daily for the next 3-5 days.  Patient instructed to titrate medications down as symptoms improve and transition to topical diclofenac.    Patient is not currently established with PCP.  Referral to establish care placed.  If patient's symptoms worsen or fail to fully resolve she was instructed to follow-up with PCP or return to clinic for reevaluation.    Differential diagnosis, natural history, supportive care, and indications for immediate follow-up discussed.

## 2019-05-13 ASSESSMENT — ENCOUNTER SYMPTOMS
MUSCLE WEAKNESS: 1
NUMBNESS: 0
TINGLING: 0

## 2019-06-19 ENCOUNTER — OFFICE VISIT (OUTPATIENT)
Dept: INTERNAL MEDICINE | Facility: MEDICAL CENTER | Age: 64
End: 2019-06-19
Payer: COMMERCIAL

## 2019-06-19 VITALS
DIASTOLIC BLOOD PRESSURE: 76 MMHG | BODY MASS INDEX: 37.43 KG/M2 | SYSTOLIC BLOOD PRESSURE: 120 MMHG | WEIGHT: 203.4 LBS | OXYGEN SATURATION: 93 % | HEIGHT: 62 IN | TEMPERATURE: 97.6 F | HEART RATE: 74 BPM | RESPIRATION RATE: 18 BRPM

## 2019-06-19 DIAGNOSIS — E66.9 OBESITY (BMI 30-39.9): ICD-10-CM

## 2019-06-19 DIAGNOSIS — Z76.89 ESTABLISHING CARE WITH NEW DOCTOR, ENCOUNTER FOR: ICD-10-CM

## 2019-06-19 DIAGNOSIS — I10 ESSENTIAL HYPERTENSION: ICD-10-CM

## 2019-06-19 DIAGNOSIS — Q21.12 PFO (PATENT FORAMEN OVALE): ICD-10-CM

## 2019-06-19 DIAGNOSIS — Z13.220 SCREENING FOR LIPID DISORDERS: ICD-10-CM

## 2019-06-19 DIAGNOSIS — E11.8 TYPE 2 DIABETES MELLITUS WITH COMPLICATION, UNSPECIFIED WHETHER LONG TERM INSULIN USE: ICD-10-CM

## 2019-06-19 PROCEDURE — 99204 OFFICE O/P NEW MOD 45 MIN: CPT | Mod: GC | Performed by: INTERNAL MEDICINE

## 2019-06-19 ASSESSMENT — PAIN SCALES - GENERAL: PAINLEVEL: 4=SLIGHT-MODERATE PAIN

## 2019-06-19 ASSESSMENT — PATIENT HEALTH QUESTIONNAIRE - PHQ9: CLINICAL INTERPRETATION OF PHQ2 SCORE: 0

## 2019-06-19 NOTE — PATIENT INSTRUCTIONS
Diabetes and Exercise  Diabetes mellitus is a common, chronic disease, in which the pancreas is unable to adequately control blood glucose (sugar) levels. There are 2 types of diabetes. Type 1 diabetes patients are unable to produce insulin, a hormone that causes sugar in the blood to be stored in the body. People with type 1 diabetes may compensate by giving themselves injections of insulin. Type 2 diabetes involves not producing adequate amounts of insulin to control blood glucose levels. People with type 2 diabetes control their blood glucose by monitoring their food intake or by taking medicine. Exercise is an important part of diabetes treatment. During exercise, the muscles use a greater amount of glucose from the blood for energy. This lowers your blood glucose, which is the same effect you would get from taking insulin. It has been shown that endurance athletes are more sensitive to insulin than inactive people.  SYMPTOMS   Many people with a mild case of diabetes have no symptoms. However, if left uncontrolled, diabetes can lead to several complications that could be prevented with treatment of the disease.  General symptoms of diabetes include:   · Frequent urination (polyuria).  · Frequent thirst and drinking (polydipsia).  · Increased food consumption (polyphagia).  · Fatigue.  · Poor exercise performance.  · Blurred vision.  · Inflammation of the vagina (vaginitis) caused by fungal infections.  · Skin infections (uncommon).  · Numbness in the feet, caused by nerve injury.  · Kidney disease.  CAUSES   The cause of most cases of diabetes is unknown. In children, diabetes is often due to an autoimmune response to the cells in the pancreas that make insulin. It is also linked with other diseases, such as cystic fibrosis. Diabetes may have a genetic link.  PREVENTION  · Athletes should strive to begin exercise with blood glucose in a well-controlled state.  · Feet should always be kept clean and  dry.  · Activities in which low blood sugar levels cannot be treated easily (scuba diving, rock climbing, swimming) should be avoided.  · Anticipate alterations in diet or training to avoid low blood sugar (hypoglycemia) and high blood sugar (hyperglycemia).  · Athletes should try to increase sugar consumption after strenuous exercise to avoid hypoglycemia.  · Short-acting insulin should not be injected into an actively exercising muscle. The athlete should rest the injection site for about 1 hour after exercise.  · Patients with diabetes should get routine checkups of the feet to prevent complications.  PROGNOSIS   Exercise provides many benefits to the person with diabetes:   · Reduced body fat.  · Lower blood pressure.  · Often, reduced need for medicines.  · Improved exercise tolerance.  · Lower insulin levels.  · Weight loss.  · Improved lipid profile (decreased cholesterol and low-density lipoproteins).  RELATED COMPLICATIONS   If performed incorrectly, exercise can result in complications of diabetes:   · Poor control of blood sugar, when exercise is performed at the wrong time.  · Increase in renal disease, from loss of body fluids (dehydration).  · Increased risk of nerve injury (neuropathy) when performing exercises that increase foot injury.  · Increased risk of eye problems when performing activities that involve breath holding or lowering or jarring the head.  · Increased risk of sudden death from exercise in patients with heart disease.  · Worsening of hypertension with heavy lifting (more than 10 lb/4.5 kg). Altered blood glucose and insulin dose as a result of mild illness that produces loss of appetite.  · Altered uptake of insulin after injection when insulin injection site is changed.  NOTE: Exercise can lower blood glucose effectively, but the effects are short-lasting (no more than a couple of days). Exercise has been shown to improve your sensitivity to insulin. This may alter how your body  responds to a given dose of injected insulin. It is important for every patient with diabetes to know how his or her body may react to exercise, and to adjust insulin dosages accordingly.  TREATMENT  · Eat about 1 to 3 hours before exercise.  · Check blood glucose immediately before and after exercise.  · Stop exercise if blood glucose is more than 250 mg/dL.  · Stop exercise if blood glucose is less than 100 mg/dL.  · Do not exercise within 1 hour of an insulin injection.  · Be prepared to treat low blood glucose while exercising. Keep some sugar product with you, such as a candy bar.  · For prolonged exercise, use a sports drink to maintain your glucose level.  · Replace used-up glucose in the body after exercise.  · Consume fluids during and after exercise to avoid dehydration.  SEEK MEDICAL CARE IF:  · You have vision changes after a run.  · You notice a loss of sensation in your feet after exercise.  · You have increased numbness, tingling, or pins and needles sensations after exercise.  · You have chest pain during or after exercise.  · You have a fast, irregular heartbeat (palpitations) during or after exercise.  · Your exercise tolerance gets worse.  · You have fainting or dizzy spells for brief periods during or after exercise.     This information is not intended to replace advice given to you by your health care provider. Make sure you discuss any questions you have with your health care provider.     Document Released: 12/18/2006 Document Revised: 01/08/2016 Document Reviewed: 02/16/2016  [employer] Patient Education ©2016 [employer]

## 2019-06-19 NOTE — PROGRESS NOTES
"New Patient to Establish    Reason to establish: New patient to establish    CC:   -Establish care with a new physician  -Follow-up on chronic medical conditions including essential hypertension and type 2 diabetes mellitus    HPI: Mrs. Fisher is a 63 years old lady who presents to the clinic for the previously mentioned reasons.  Past medical history significant for essential hypertension, type 2 diabetes mellitus, PFO, obesity.    Patient denies having any complaints today she reports that she is compliant with her medications and she would like to establish care with us to continue monitoring her chronic medical conditions.    Patient Active Problem List    Diagnosis Date Noted   • Type 2 diabetes mellitus with complication ([facility]) 06/19/2019   • Obesity (BMI 30-39.9) 06/19/2019   • HTN (hypertension) 07/20/2012   • PFO (patent foramen ovale), noted on echo 2012 07/20/2012   • Persistent headaches 06/14/2012       Past Medical History:   Diagnosis Date   • Arthritis    • Heart abnormality     patent foramen ovale   • Heart burn     joints, neck (buldging discs)   • Hypertension    • Infectious disease 2016    bronchitis   • Insulin resistance 2016   • Paralysis ([facility]) 2016    facial paralysis x 3, Pt states\" 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 1 Tube 0   • losartan (COZAAR) 100 MG Tab Take 1 Tab by mouth every day. 90 Tab 3   • ALOE VERA JUICE PO Take  by mouth.     • aspirin (ASA) 325 MG TABS Take 325 mg by mouth every day.       No current facility-administered medications for this visit.        Allergies as of 06/19/2019 - Reviewed 06/19/2019   Allergen Reaction Noted   • Codeine  08/03/2012       Social History     Social History   • Marital status:      Spouse name: N/A   • Number of children: N/A "   • Years of education: N/A     Occupational History   • Not on file.     Social History Main Topics   • Smoking status: Never Smoker   • Smokeless tobacco: Never Used   • Alcohol use Yes      Comment: 1-2 glasses of wine 3-4 times a week   • Drug use: No   • Sexual activity: No     Other Topics Concern   • Not on file     Social History Narrative   • No narrative on file       Family History   Problem Relation Age of Onset   • Clotting Disorder Mother    • Heart Attack Father 65   • Heart Disease Father    • Lung Disease Father    • Cancer Paternal Aunt 52        ovarian ca   • Cancer Paternal Grandmother 45        cervical   • Cancer Paternal Aunt 48        ovarian ca       Past Surgical History:   Procedure Laterality Date   • SHOULDER ARTHROSCOPY W/ ROTATOR CUFF REPAIR Right 2/29/2016    Procedure: SHOULDER ARTHROSCOPY W/ ROTATOR CUFF REPAIR  W/DEBRIDEMENT;  Surgeon: Dejon Lawton M.D.;  Location: [facility];  Service:    • SHOULDER DECOMPRESSION ARTHROSCOPIC Right 2/29/2016    Procedure: SHOULDER DECOMPRESSION ARTHROSCOPIC - SUBACROMIAL biceps tenotomy;  Surgeon: Dejon Lawton M.D.;  Location: [facility];  Service:    • RECOVERY  8/3/2012    Performed by MATEUSZ FERGUSON at SURGERY SAME DAY [facility]   • ABDOMINAL HYSTERECTOMY TOTAL  1992    total abdominal hysterectomy   • BREAST BIOPSY  1992    hx of benign left breast bx.   • PRIMARY C SECTION  1986   • APPENDECTOMY  1969       ROS: As per HPI. Additional pertinent systems as noted below.  Constitutional: Negative for chills and fever.   HENT: Negative for ear pain and sore throat.    Eyes: Negative for discharge and redness.   Respiratory: Negative for cough, hemoptysis, wheezing and stridor.    Cardiovascular: Negative for chest pain, palpitations and leg swelling.   Gastrointestinal: Negative for abdominal pain, constipation, diarrhea, heartburn, nausea and vomiting.   Genitourinary: Negative for dysuria, flank pain  "and hematuria.   Musculoskeletal: Negative for falls and myalgias.   Skin: Negative for itching and rash.   Neurological: Negative for dizziness, seizures, loss of consciousness and headaches.   Endo/Heme/Allergies: Negative for polydipsia. Does not bruise/bleed easily.   Psychiatric/Behavioral: Negative for substance abuse and suicidal ideas.       /76 (BP Location: Left arm, Patient Position: Sitting, BP Cuff Size: Large adult)   Pulse 74   Temp 36.4 °C (97.6 °F) (Temporal)   Resp 18   Ht 1.58 m (5' 2.21\")   Wt 92.3 kg (203 lb 6.4 oz)   SpO2 93%   Breastfeeding? No   BMI 36.96 kg/m²     Physical Exam  General:  Alert and oriented, No apparent distress.    Eyes: Pupils equal and reactive. No scleral icterus.    Throat: Clear no erythema or exudates noted.    Neck: Supple. No lymphadenopathy noted. Thyroid not enlarged.    Lungs: Clear to auscultation and percussion bilaterally.    Cardiovascular: Regular rate and rhythm. No murmurs, rubs or gallops.    Abdomen:  Benign. No rebound or guarding noted.    Extremities: No clubbing, cyanosis, edema.    Skin: Clear. No rash or suspicious skin lesions noted.    Other:     Note: I have reviewed all pertinent labs and diagnostic tests associated with this visit with specific comments listed under the assessment and plan below    Assessment and Plan    1. Dyslipidemia  2. Obesity (BMI 30-39.9)  -Last blood test was done in 2013  -Results for YESENIA AGUILAR (MRN [id number]) as of 6/19/2019 15:49   Ref. Range 10/18/2013 09:31   Cholesterol,Tot Latest Ref Range: 100 - 199 mg/dL 191   Triglycerides Latest Ref Range: 0 - 149 mg/dL 107   HDL Latest Ref Range: >39 mg/dL 61   LDL Latest Ref Range: 0 - 99 mg/dL 109 (H)   VLDL Cholesterol Calc Latest Ref Range: 5 - 40 mg/dL 21     -Reports a significant family history of heart attack in her father at age 65  -Denies personal history of coronary arteries  -Patient was diagnosed with type 2 diabetes mellitus and " currently her BMI is 36.9  -Patient currently not taking any lipid-lowering agent  Plan  -Repeat lipid panel  -We will calculate her ASCVD score on we will consider initiating the patient on lipid-lowering agent  -Educate the patient about dyslipidemia on the correlation between dyslipidemia and ischemic heart disease, stroke  -Advised the patient to do aerobic physical exercise and diet modification to lose weight    3. Type 2 diabetes mellitus with complication, unspecified whether long term insulin use (HCC) WITH PERIPHERAL NEUROPATHY  -Patient reports that she was diagnosed with type 2 diabetes mellitus by her previous PCP  -No glycohemoglobin level on file  -Patient previously was on metformin and dulaglutide injection  -Denies signs and symptoms of diabetes mellitus  -Patient reports that she had pneumonia vaccine previously  -She is not sure if she had Tdap vaccine within the last 10 years, patient going to check her vaccinations records and will update that her next office visit  -Monofilament test positive for bilateral peripheral neuropathy involving her bilateral feet  -No recent kidney function test on file  Plan  -CMP to check on kidney function test  -Microalbumin creatinine ratio  -Glycohemoglobin level  -Educate the patient about diabetes mellitus type 2  -Educate the patient about peripheral neuropathy  -We will consider adding gabapentin to her treatment regimen if indicated on next office visit  -We will consider providing patient with Tdap/TD vaccine if she did not receive it within the last 10 years    4. PFO (patent foramen ovale), noted on echo 2012  -Accidental finding in 2012 when the patient was admitted to the hospital because of TIA  -Patient was evaluated by a cardiologist in the hospital and she received a referral to cardiothoracic surgeon who decided against doing PFO repair as there was no documented ischemic stroke on her MRI  -Patient currently on aspirin 325 mg daily as  recommended by her cardiologist (Dr. Conner)    5. Essential hypertension  -Very well controlled with losartan 100 mg daily  -No recent kidney function test on file  -Patient reports being compliant with her medication denies having any side effects  -Denies headache, blurred vision, chest pain, palpitations  -Physical exam is negative for JVD and peripheral edema  Plan  -Advised patient to continue losartan 100 mg daily  -CMP to check on kidney function test  -Lipid panel to check on dyslipidemia  -Glycohemoglobin to check on diabetes mellitus    6.  Establish care with a new physician  -Patient has no primary care physician and would like to establish care with us    Followup: Return in about 1 month (around 7/19/2019).    Risk Assessment (discuss potential complications a function of chronic problems): Risk assessment has been discussed with the patient    Complexity (discuss number of co-morbidities): Comorbidities have been discussed with the patient    Signed by: Codi Hankins M.D.

## 2019-07-12 LAB
ALBUMIN SERPL-MCNC: 4.4 G/DL (ref 3.6–4.8)
ALBUMIN/CREAT UR: <2.9 MG/G CREAT (ref 0–30)
ALBUMIN/GLOB SERPL: 1.8 {RATIO} (ref 1.2–2.2)
ALP SERPL-CCNC: 93 IU/L (ref 39–117)
ALT SERPL-CCNC: 13 IU/L (ref 0–32)
AST SERPL-CCNC: 21 IU/L (ref 0–40)
BASOPHILS # BLD AUTO: 0 X10E3/UL (ref 0–0.2)
BASOPHILS NFR BLD AUTO: 1 %
BILIRUB SERPL-MCNC: 0.9 MG/DL (ref 0–1.2)
BUN SERPL-MCNC: 14 MG/DL (ref 8–27)
BUN/CREAT SERPL: 22 (ref 12–28)
CALCIUM SERPL-MCNC: 9 MG/DL (ref 8.7–10.3)
CHLORIDE SERPL-SCNC: 109 MMOL/L (ref 96–106)
CHOLEST SERPL-MCNC: 168 MG/DL (ref 100–199)
CO2 SERPL-SCNC: 21 MMOL/L (ref 20–29)
CREAT SERPL-MCNC: 0.64 MG/DL (ref 0.57–1)
CREAT UR-MCNC: 102.2 MG/DL
EOSINOPHIL # BLD AUTO: 0.1 X10E3/UL (ref 0–0.4)
EOSINOPHIL NFR BLD AUTO: 2 %
ERYTHROCYTE [DISTWIDTH] IN BLOOD BY AUTOMATED COUNT: 13 % (ref 12.3–15.4)
GLOBULIN SER CALC-MCNC: 2.5 G/DL (ref 1.5–4.5)
GLUCOSE SERPL-MCNC: 108 MG/DL (ref 65–99)
HBA1C MFR BLD: 5.8 % (ref 4.8–5.6)
HCT VFR BLD AUTO: 42.1 % (ref 34–46.6)
HDLC SERPL-MCNC: 71 MG/DL
HGB BLD-MCNC: 14 G/DL (ref 11.1–15.9)
IMM GRANULOCYTES # BLD AUTO: 0 X10E3/UL (ref 0–0.1)
IMM GRANULOCYTES NFR BLD AUTO: 0 %
IMMATURE CELLS  115398: NORMAL
LABORATORY COMMENT REPORT: NORMAL
LDLC SERPL CALC-MCNC: 84 MG/DL (ref 0–99)
LYMPHOCYTES # BLD AUTO: 2.3 X10E3/UL (ref 0.7–3.1)
LYMPHOCYTES NFR BLD AUTO: 39 %
MCH RBC QN AUTO: 29.9 PG (ref 26.6–33)
MCHC RBC AUTO-ENTMCNC: 33.3 G/DL (ref 31.5–35.7)
MCV RBC AUTO: 90 FL (ref 79–97)
MICROALBUMIN UR-MCNC: <3 UG/ML
MONOCYTES # BLD AUTO: 0.4 X10E3/UL (ref 0.1–0.9)
MONOCYTES NFR BLD AUTO: 7 %
MORPHOLOGY BLD-IMP: NORMAL
NEUTROPHILS # BLD AUTO: 3 X10E3/UL (ref 1.4–7)
NEUTROPHILS NFR BLD AUTO: 51 %
NRBC BLD AUTO-RTO: NORMAL %
PLATELET # BLD AUTO: 234 X10E3/UL (ref 150–450)
POTASSIUM SERPL-SCNC: 4.4 MMOL/L (ref 3.5–5.2)
PROT SERPL-MCNC: 6.9 G/DL (ref 6–8.5)
RBC # BLD AUTO: 4.68 X10E6/UL (ref 3.77–5.28)
SODIUM SERPL-SCNC: 143 MMOL/L (ref 134–144)
TRIGL SERPL-MCNC: 63 MG/DL (ref 0–149)
VLDLC SERPL CALC-MCNC: 13 MG/DL (ref 5–40)
WBC # BLD AUTO: 6 X10E3/UL (ref 3.4–10.8)

## 2019-10-12 DIAGNOSIS — I10 ESSENTIAL HYPERTENSION: ICD-10-CM

## 2019-10-14 RX ORDER — LOSARTAN POTASSIUM 100 MG/1
TABLET ORAL
Qty: 90 TAB | Refills: 2 | Status: SHIPPED | OUTPATIENT
Start: 2019-10-14

## 2019-10-15 ENCOUNTER — HOSPITAL ENCOUNTER (OUTPATIENT)
Dept: RADIOLOGY | Facility: MEDICAL CENTER | Age: 64
End: 2019-10-15
Attending: PHYSICIAN ASSISTANT
Payer: COMMERCIAL

## 2019-10-15 ENCOUNTER — OFFICE VISIT (OUTPATIENT)
Dept: URGENT CARE | Facility: PHYSICIAN GROUP | Age: 64
End: 2019-10-15
Payer: COMMERCIAL

## 2019-10-15 VITALS
OXYGEN SATURATION: 96 % | WEIGHT: 203 LBS | BODY MASS INDEX: 34.66 KG/M2 | TEMPERATURE: 97 F | RESPIRATION RATE: 16 BRPM | HEIGHT: 64 IN | SYSTOLIC BLOOD PRESSURE: 124 MMHG | HEART RATE: 78 BPM | DIASTOLIC BLOOD PRESSURE: 74 MMHG
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
## 2019-10-15 DIAGNOSIS — S61.051A DOG BITE OF RIGHT THUMB WITH INFECTION, INITIAL ENCOUNTER: ICD-10-CM

## 2019-10-15 DIAGNOSIS — L08.9 DOG BITE OF RIGHT THUMB WITH INFECTION, INITIAL ENCOUNTER: ICD-10-CM

## 2019-10-15 PROCEDURE — 99214 OFFICE O/P EST MOD 30 MIN: CPT | Performed by: PHYSICIAN ASSISTANT

## 2019-10-15 PROCEDURE — 73130 X-RAY EXAM OF HAND: CPT | Mod: RT

## 2019-10-15 RX ORDER — AMOXICILLIN AND CLAVULANATE POTASSIUM 875; 125 MG/1; MG/1
1 TABLET, FILM COATED ORAL 2 TIMES DAILY
Qty: 20 TAB | Refills: 0 | Status: SHIPPED
Start: 2019-10-15 | End: 2020-07-22

## 2019-10-16 NOTE — PROGRESS NOTES
Subjective:      Giorgi Whitlock is a 64 y.o. female who presents with Animal Bite (Dog bite to Rt thumb, swelling, hot to the touch x3days )    PMH:  has a past medical history of Arthritis, Heart abnormality, Heart burn, Hypertension, Infectious disease (2016), Insulin resistance (2016), Paralysis (HCC) (2016), PFO (patent foramen ovale), and Stroke ([facility]) (2012).  MEDS:   Current Outpatient Medications:   •  amoxicillin-clavulanate (AUGMENTIN) 875-125 MG Tab, Take 1 Tab by mouth 2 times a day., Disp: 20 Tab, Rfl: 0  •  losartan (COZAAR) 100 MG Tab, TAKE ONE TABLET BY MOUTH DAILY, Disp: 90 Tab, Rfl: 2  •  ALOE VERA JUICE PO, Take  by mouth., Disp: , Rfl:   •  aspirin (ASA) 325 MG TABS, Take 325 mg by mouth every day., Disp: , Rfl:   •  Diclofenac Sodium 1 % Gel, Apply 2 g to skin as directed 2 Times a Day. (Patient not taking: Reported on 10/15/2019), Disp: 1 Tube, Rfl: 0  ALLERGIES:   Allergies   Allergen Reactions   • Codeine      vomit     SURGHX:   Past Surgical History:   Procedure Laterality Date   • SHOULDER ARTHROSCOPY W/ ROTATOR CUFF REPAIR Right 2/29/2016    Procedure: SHOULDER ARTHROSCOPY W/ ROTATOR CUFF REPAIR  W/DEBRIDEMENT;  Surgeon: Dejon Lawton M.D.;  Location: [facility];  Service:    • SHOULDER DECOMPRESSION ARTHROSCOPIC Right 2/29/2016    Procedure: SHOULDER DECOMPRESSION ARTHROSCOPIC - SUBACROMIAL biceps tenotomy;  Surgeon: Dejon Lawton M.D.;  Location: [facility];  Service:    • RECOVERY  8/3/2012    Performed by MATEUSZ FERGUSON at SURGERY SAME DAY [facility]   • ABDOMINAL HYSTERECTOMY TOTAL  1992    total abdominal hysterectomy   • BREAST BIOPSY  1992    hx of benign left breast bx.   • PRIMARY C SECTION  1986   • APPENDECTOMY  1969     SOCHX:  reports that she has never smoked. She has never used smokeless tobacco. She reports that she drinks alcohol. She reports that she does not use drugs.  FH: Reviewed with patient, not pertinent to  "this visit.           Patient presents with:  Animal Bite: Dog bite to Rt thumb, swelling, hot to the touch x3days ago.  PT was playing with her dog with a toy and the dog went to bite the toy, nipped her thumb by accident.  Pt has washed her wound, has been using otc antibacterial ointment as well without improvement.     PT tdap is up to date.  Dog's immunizations are also up to date.         Animal Bite   This is a new problem. Episode onset: 3 days. The problem occurs constantly. The problem has been gradually worsening. Associated symptoms include arthralgias. Pertinent negatives include no numbness. The symptoms are aggravated by bending and twisting. She has tried NSAIDs and ice for the symptoms. The treatment provided no relief.       Review of Systems   Musculoskeletal: Positive for arthralgias and joint pain.   Neurological: Negative for tingling, sensory change, focal weakness and numbness.   All other systems reviewed and are negative.         Objective:     /74   Pulse 78   Temp 36.1 °C (97 °F) (Temporal)   Resp 16   Ht 1.626 m (5' 4\")   Wt 92.1 kg (203 lb)   SpO2 96%   BMI 34.84 kg/m²      Physical Exam   Constitutional: She is oriented to person, place, and time. She appears well-developed and well-nourished. No distress.   HENT:   Head: Normocephalic and atraumatic.   Nose: Nose normal.   Eyes: Pupils are equal, round, and reactive to light. Conjunctivae and EOM are normal.   Neck: Normal range of motion. Neck supple.   Cardiovascular: Normal rate and intact distal pulses.   Pulmonary/Chest: Effort normal.   Musculoskeletal:        Right hand: She exhibits decreased range of motion, tenderness, bony tenderness and swelling. She exhibits normal two-point discrimination, normal capillary refill, no deformity and no laceration. Normal sensation noted. Normal strength noted.        Hands:  Neurological: She is alert and oriented to person, place, and time.   Skin: Skin is warm and dry. " Capillary refill takes less than 2 seconds.   Psychiatric: She has a normal mood and affect.   Nursing note and vitals reviewed.        Xray images viewed and interpreted by me, confirmed by radiology:    Impression       Soft tissue swelling and laceration in the vicinity of the first MCP without radiographic evidence of osteomyelitis. If more sensitive analysis is required, MRI could be performed    Mild osteoarthritis             Last Resulted: 10/15/19  6:20 PM          Assessment/Plan:     1. Dog bite of right thumb with infection, initial encounter  DX-HAND 3+ RIGHT    amoxicillin-clavulanate (AUGMENTIN) 875-125 MG Tab     PT can continue OTC medications, increase fluids and rest until symptoms improve.     PT to soak injured area in epsom salt/warm water soaks, 3-4 times daily until significantly improved.      PT should follow up with PCP in 1-2 days for re-evaluation if symptoms have not improved.  Discussed red flags and reasons to return to UC or ED.  Pt and/or family verbalized understanding of diagnosis and follow up instructions and was offered informational handout on diagnosis.  PT discharged.

## 2019-10-19 ASSESSMENT — ENCOUNTER SYMPTOMS
SENSORY CHANGE: 0
TINGLING: 0
ARTHRALGIAS: 1
NUMBNESS: 0
FOCAL WEAKNESS: 0

## 2020-07-13 ENCOUNTER — TELEPHONE (OUTPATIENT)
Dept: SCHEDULING | Facility: IMAGING CENTER | Age: 65
End: 2020-07-13

## 2020-07-22 ENCOUNTER — OFFICE VISIT (OUTPATIENT)
Dept: MEDICAL GROUP | Facility: MEDICAL CENTER | Age: 65
End: 2020-07-22
Payer: COMMERCIAL

## 2020-07-22 VITALS
SYSTOLIC BLOOD PRESSURE: 138 MMHG | BODY MASS INDEX: 34.25 KG/M2 | HEIGHT: 64 IN | DIASTOLIC BLOOD PRESSURE: 82 MMHG | TEMPERATURE: 97 F | OXYGEN SATURATION: 95 % | HEART RATE: 76 BPM | WEIGHT: 200.62 LBS | RESPIRATION RATE: 20 BRPM

## 2020-07-22 DIAGNOSIS — G45.9 TIA (TRANSIENT ISCHEMIC ATTACK): ICD-10-CM

## 2020-07-22 DIAGNOSIS — Z90.722 HISTORY OF TOTAL HYSTERECTOMY WITH BILATERAL SALPINGO-OOPHORECTOMY (BSO): ICD-10-CM

## 2020-07-22 DIAGNOSIS — Z00.00 PREVENTATIVE HEALTH CARE: ICD-10-CM

## 2020-07-22 DIAGNOSIS — N64.4 BREAST PAIN: ICD-10-CM

## 2020-07-22 DIAGNOSIS — Z90.79 HISTORY OF TOTAL HYSTERECTOMY WITH BILATERAL SALPINGO-OOPHORECTOMY (BSO): ICD-10-CM

## 2020-07-22 DIAGNOSIS — Z90.710 HISTORY OF TOTAL HYSTERECTOMY WITH BILATERAL SALPINGO-OOPHORECTOMY (BSO): ICD-10-CM

## 2020-07-22 PROBLEM — Z84.2 FAMILY HISTORY OF TOTAL ABDOMINAL HYSTERECTOMY AND BILATERAL SALPINGO-OOPHORECTOMY: Status: ACTIVE | Noted: 2020-07-22

## 2020-07-22 PROBLEM — Z84.2 FAMILY HISTORY OF TOTAL ABDOMINAL HYSTERECTOMY AND BILATERAL SALPINGO-OOPHORECTOMY: Status: RESOLVED | Noted: 2020-07-22 | Resolved: 2020-07-22

## 2020-07-22 PROCEDURE — 99214 OFFICE O/P EST MOD 30 MIN: CPT | Performed by: PHYSICIAN ASSISTANT

## 2020-07-22 RX ORDER — IBUPROFEN 800 MG/1
800 TABLET ORAL
COMMUNITY

## 2020-07-22 ASSESSMENT — FIBROSIS 4 INDEX: FIB4 SCORE: 1.54

## 2020-07-22 ASSESSMENT — PATIENT HEALTH QUESTIONNAIRE - PHQ9: CLINICAL INTERPRETATION OF PHQ2 SCORE: 0

## 2020-07-22 NOTE — PROGRESS NOTES
"Subjective:   CC:   Chief Complaint   Patient presents with   • Establish Care   • Breast Pain     R x 3 weeks,\"2 weeks feels like I have to breast feed\"       Giorgi Whitlock is a 64 y.o. female here today for establishing care.    Patient complains of sharp pain in right breast.  States sensation started like something was poking at her right breast.  She changed her bra however sensation continued.  She started having sensation of pressure in both breasts (like when breast-feeding), no nipple discharge.      Patient states her body acts weird many times.  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  Getting dizzy and having vertigo.  Sometimes she gets right-sided face pain with ear pain and feeling like an ice pick is piercing through the ear.    Has had history of TIA in the past, apparently due to patent foramen ovale send a blood clot to her brain.  Currently takes losartan 100 mg daily for hypertension.    Patient had total hysterectomy with salpingo-oophorectomy years ago due to family history of ovarian cancer.  Currently only uses OTC progesterone cream topical.    Current medicines (including changes today)  Current Outpatient Medications   Medication Sig Dispense Refill   • ibuprofen (MOTRIN) 800 MG Tab Take 800 mg by mouth.     • Vitamin D-Vitamin K (K2 PLUS D3 PO) Take  by mouth. 125 mcg vit d,180 vitamin k     • NON SPECIFIED focust factor supplement     • dimenhyDRINATE (DRAMAMINE PO) Take  by mouth.     • losartan (COZAAR) 100 MG Tab TAKE ONE TABLET BY MOUTH DAILY 90 Tab 2   • ALOE VERA JUICE PO Take  by mouth.     • aspirin (ASA) 325 MG TABS Take 325 mg by mouth every day.     • Diclofenac Sodium 1 % Gel Apply 2 g to skin as directed 2 Times a Day. (Patient not taking: Reported on 10/15/2019) 1 Tube 0     No current facility-administered medications for this visit.          Past medical, surgical, family, and social " "history are reviewed in Epic chart by me today.   Medications and allergies reviewed in Epic chart by me today.         ROS   No chest pain, no shortness of breath, no abdominal pain  As documented in history of present illness above     Objective:     /82 (BP Location: Right arm, Patient Position: Sitting)   Pulse 76   Temp 36.1 °C (97 °F) (Temporal)   Resp 20   Ht 1.626 m (5' 4\")   Wt 91 kg (200 lb 9.9 oz)   SpO2 95%  Body mass index is 34.44 kg/m².   Physical Exam:  Constitutional: Alert, oriented in no acute distress.  Psych: Eye contact is good, speech goal directed, affect calm  Lungs: Unlabored respiratory effort, clear to auscultation bilaterally with good excursion, no wheez or rhonci  CV: regular rate and rhythm. No lower extremity edema  Abdomen: soft, nontender, No CVAT  Skin: no lesions in visible areas.  Ext: no edema, color normal, vascularity normal, temperature normal  Breast: No lumps or bumps noted, nipple without any discharge or retraction.  No skin abnormalities noted no tenderness to palpation.      Assessment and Plan:   The following treatment plan was discussed      1. TIA (transient ischemic attack)      2. Breast pain    - MA-DIAGNOSTIC MAMMO BILAT W/O CAD; Future  - PROLACTIN; Future    3. Preventative health care    - CBC WITH DIFFERENTIAL; Future  - Comp Metabolic Panel; Future  - Lipid Profile; Future  - TSH WITH REFLEX TO FT4; Future  - VITAMIN D,25 HYDROXY; Future    4. History of total hysterectomy with bilateral salpingo-oophorectomy (BSO)        Followup:  4 wks if if symptoms continues, sooner if worsen         Please note that this dictation was created using voice recognition software. I have made every reasonable attempt to correct obvious errors, but I expect that there are errors of grammar and possibly content that I did not discover before finalizing the note.    "

## 2020-07-24 LAB
25(OH)D3+25(OH)D2 SERPL-MCNC: 41.7 NG/ML (ref 30–100)
ALBUMIN SERPL-MCNC: 4.4 G/DL (ref 3.8–4.8)
ALBUMIN/GLOB SERPL: 1.8 {RATIO} (ref 1.2–2.2)
ALP SERPL-CCNC: 77 IU/L (ref 39–117)
ALT SERPL-CCNC: 11 IU/L (ref 0–32)
AST SERPL-CCNC: 20 IU/L (ref 0–40)
BASOPHILS # BLD AUTO: 0.1 X10E3/UL (ref 0–0.2)
BASOPHILS NFR BLD AUTO: 1 %
BILIRUB SERPL-MCNC: 1.1 MG/DL (ref 0–1.2)
BUN SERPL-MCNC: 15 MG/DL (ref 8–27)
BUN/CREAT SERPL: 22 (ref 12–28)
CALCIUM SERPL-MCNC: 9.4 MG/DL (ref 8.7–10.3)
CHLORIDE SERPL-SCNC: 103 MMOL/L (ref 96–106)
CHOLEST SERPL-MCNC: 188 MG/DL (ref 100–199)
CO2 SERPL-SCNC: 21 MMOL/L (ref 20–29)
CREAT SERPL-MCNC: 0.69 MG/DL (ref 0.57–1)
EOSINOPHIL # BLD AUTO: 0.1 X10E3/UL (ref 0–0.4)
EOSINOPHIL NFR BLD AUTO: 2 %
ERYTHROCYTE [DISTWIDTH] IN BLOOD BY AUTOMATED COUNT: 12 % (ref 11.7–15.4)
GLOBULIN SER CALC-MCNC: 2.4 G/DL (ref 1.5–4.5)
GLUCOSE SERPL-MCNC: 104 MG/DL (ref 65–99)
HCT VFR BLD AUTO: 41 % (ref 34–46.6)
HDLC SERPL-MCNC: 85 MG/DL
HGB BLD-MCNC: 14.2 G/DL (ref 11.1–15.9)
IMM GRANULOCYTES # BLD AUTO: 0 X10E3/UL (ref 0–0.1)
IMM GRANULOCYTES NFR BLD AUTO: 0 %
IMMATURE CELLS  115398: NORMAL
LABORATORY COMMENT REPORT: NORMAL
LDLC SERPL CALC-MCNC: 85 MG/DL (ref 0–99)
LYMPHOCYTES # BLD AUTO: 2.2 X10E3/UL (ref 0.7–3.1)
LYMPHOCYTES NFR BLD AUTO: 39 %
MCH RBC QN AUTO: 31.6 PG (ref 26.6–33)
MCHC RBC AUTO-ENTMCNC: 34.6 G/DL (ref 31.5–35.7)
MCV RBC AUTO: 91 FL (ref 79–97)
MONOCYTES # BLD AUTO: 0.4 X10E3/UL (ref 0.1–0.9)
MONOCYTES NFR BLD AUTO: 7 %
MORPHOLOGY BLD-IMP: NORMAL
NEUTROPHILS # BLD AUTO: 2.9 X10E3/UL (ref 1.4–7)
NEUTROPHILS NFR BLD AUTO: 51 %
NRBC BLD AUTO-RTO: NORMAL %
PLATELET # BLD AUTO: 237 X10E3/UL (ref 150–450)
POTASSIUM SERPL-SCNC: 4.4 MMOL/L (ref 3.5–5.2)
PROLACTIN SERPL-MCNC: 7.3 NG/ML (ref 4.8–23.3)
PROT SERPL-MCNC: 6.8 G/DL (ref 6–8.5)
RBC # BLD AUTO: 4.5 X10E6/UL (ref 3.77–5.28)
SODIUM SERPL-SCNC: 141 MMOL/L (ref 134–144)
TRIGL SERPL-MCNC: 89 MG/DL (ref 0–149)
TSH SERPL DL<=0.005 MIU/L-ACNC: 1.15 UIU/ML (ref 0.45–4.5)
VLDLC SERPL CALC-MCNC: 18 MG/DL (ref 5–40)
WBC # BLD AUTO: 5.7 X10E3/UL (ref 3.4–10.8)

## 2021-03-15 DIAGNOSIS — Z23 NEED FOR VACCINATION: ICD-10-CM

## 2021-05-30 ENCOUNTER — APPOINTMENT (OUTPATIENT)
Dept: RADIOLOGY | Facility: MEDICAL CENTER | Age: 66
End: 2021-05-30
Attending: EMERGENCY MEDICINE
Payer: COMMERCIAL

## 2021-05-30 ENCOUNTER — HOSPITAL ENCOUNTER (EMERGENCY)
Facility: MEDICAL CENTER | Age: 66
End: 2021-05-30
Attending: EMERGENCY MEDICINE
Payer: COMMERCIAL

## 2021-05-30 VITALS
HEIGHT: 63 IN | HEART RATE: 90 BPM | TEMPERATURE: 97.5 F | WEIGHT: 200 LBS | RESPIRATION RATE: 16 BRPM | OXYGEN SATURATION: 95 % | SYSTOLIC BLOOD PRESSURE: 162 MMHG | BODY MASS INDEX: 35.44 KG/M2 | DIASTOLIC BLOOD PRESSURE: 77 MMHG

## 2021-05-30 DIAGNOSIS — S43.005A DISLOCATION OF LEFT SHOULDER JOINT, INITIAL ENCOUNTER: ICD-10-CM
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
## 2021-05-30 PROCEDURE — 94799 UNLISTED PULMONARY SVC/PX: CPT

## 2021-05-30 PROCEDURE — 700111 HCHG RX REV CODE 636 W/ 250 OVERRIDE (IP)

## 2021-05-30 PROCEDURE — 73030 X-RAY EXAM OF SHOULDER: CPT | Mod: LT

## 2021-05-30 PROCEDURE — 96374 THER/PROPH/DIAG INJ IV PUSH: CPT

## 2021-05-30 PROCEDURE — 96375 TX/PRO/DX INJ NEW DRUG ADDON: CPT

## 2021-05-30 PROCEDURE — 23650 CLTX SHO DSLC W/MNPJ WO ANES: CPT

## 2021-05-30 PROCEDURE — 700111 HCHG RX REV CODE 636 W/ 250 OVERRIDE (IP): Performed by: EMERGENCY MEDICINE

## 2021-05-30 PROCEDURE — 96376 TX/PRO/DX INJ SAME DRUG ADON: CPT

## 2021-05-30 PROCEDURE — 99285 EMERGENCY DEPT VISIT HI MDM: CPT

## 2021-05-30 RX ORDER — PROPOFOL 10 MG/ML
60 INJECTION, EMULSION INTRAVENOUS ONCE
Status: DISCONTINUED | OUTPATIENT
Start: 2021-05-30 | End: 2021-05-30 | Stop reason: HOSPADM

## 2021-05-30 RX ORDER — PROPOFOL 10 MG/ML
INJECTION, EMULSION INTRAVENOUS
Status: COMPLETED | OUTPATIENT
Start: 2021-05-30 | End: 2021-05-30

## 2021-05-30 RX ORDER — HYDROCODONE BITARTRATE AND ACETAMINOPHEN 5; 325 MG/1; MG/1
1 TABLET ORAL EVERY 6 HOURS PRN
Qty: 12 TABLET | Refills: 0 | Status: SHIPPED | OUTPATIENT
Start: 2021-05-30 | End: 2021-06-02

## 2021-05-30 RX ADMIN — PROPOFOL 60 MG: 10 INJECTION, EMULSION INTRAVENOUS at 14:54

## 2021-05-30 RX ADMIN — PROPOFOL 30 MG: 10 INJECTION, EMULSION INTRAVENOUS at 14:56

## 2021-05-30 RX ADMIN — FENTANYL CITRATE 50 MCG: 50 INJECTION, SOLUTION INTRAMUSCULAR; INTRAVENOUS at 14:07

## 2021-05-30 RX ADMIN — FENTANYL CITRATE 100 MCG: 50 INJECTION, SOLUTION INTRAMUSCULAR; INTRAVENOUS at 13:10

## 2021-05-30 RX ADMIN — PROPOFOL 30 MG: 10 INJECTION, EMULSION INTRAVENOUS at 14:58

## 2021-05-30 ASSESSMENT — PAIN DESCRIPTION - PAIN TYPE: TYPE: ACUTE PAIN

## 2021-05-30 ASSESSMENT — FIBROSIS 4 INDEX: FIB4 SCORE: 1.6

## 2021-05-30 NOTE — ED TRIAGE NOTES
",  Chief Complaint   Patient presents with   • T-5000 FALL     MGLF   • Shoulder Injury     LEFT, deformity noted, CMS intact, numbness present in affect limb.      Patient brought in by EMS from home for MGLF.  Patient struck left shoulder door frame, then hit floor.  CMS intact.  Patient in sling, supported by pillows.      EMS gave: 100 mcg fentanyl; 2 mg versed; 30 mg ketamine; 4 mg Zofran; 400cc NS    BP (!) 191/89   Pulse 87   Temp 36.1 °C (97 °F) (Temporal)   Resp 20   Ht 1.6 m (5' 3\")   Wt 90.7 kg (200 lb)   SpO2 94%   BMI 35.43 kg/m²     "

## 2021-05-30 NOTE — ED PROVIDER NOTES
ED Provider Note    ER PROVIDER NOTE      CHIEF COMPLAINT  Chief Complaint   Patient presents with   • T-5000 FALL     MGLF   • Shoulder Injury     LEFT, deformity noted, CMS intact, numbness present in affect limb.        HPI  Giorgi Whitlock is a 65 y.o. female who presents to the emergency department complaining of left shoulder pain.  Patient tripped and fell hitting her shoulder directly on a door jam, has had pain to that area since.  She reports no other injury, no chest pain or weakness or numbness but states it hurts to move the arm.  She does report some spasm going up into her left neck, no elbow or hand pain.    REVIEW OF SYSTEMS  Pertinent positives include shoulder pain. Pertinent negatives include no chest pain. See HPI for details. All other systems reviewed and are negative.    PAST MEDICAL HISTORY   has a past medical history of Arthritis, Family history of total abdominal hysterectomy and bilateral salpingo-oophorectomy (7/22/2020), Heart abnormality, Heart burn, Hypertension, Infectious disease (2016), Insulin resistance (2016), Paralysis ([facility]) (2016), PFO (patent foramen ovale), and Stroke ([facility]) (2012).    SURGICAL HISTORY   has a past surgical history that includes recovery (8/3/2012); abdominal hysterectomy total (1992); primary c section (1986); breast biopsy (1992); appendectomy (1969); shoulder arthroscopy w/ rotator cuff repair (Right, 2/29/2016); and shoulder decompression arthroscopic (Right, 2/29/2016).    FAMILY HISTORY  Family History   Problem Relation Age of Onset   • Clotting Disorder Mother    • Lupus Mother    • Heart Attack Father 65   • Heart Disease Father    • Lung Disease Father    • Cancer Paternal Aunt 52        ovarian ca   • Cancer Paternal Grandmother 45        cervical   • Cancer Paternal Aunt 48        ovarian ca       SOCIAL HISTORY  Social History     Socioeconomic History   • Marital status:      Spouse name: Not on file   • Number of children:  Not on file   • Years of education: Not on file   • Highest education level: Not on file   Occupational History   • Occupation: commercial realtor    Tobacco Use   • Smoking status: Never Smoker   • Smokeless tobacco: Never Used   Substance and Sexual Activity   • Alcohol use: Yes     Comment: 1-2 glasses of wine 3-4 times a week   • Drug use: No   • Sexual activity: Yes   Other Topics Concern   • Not on file   Social History Narrative   • Not on file     Social Determinants of Health     Financial Resource Strain:    • Difficulty of Paying Living Expenses:    Food Insecurity:    • Worried About Running Out of Food in the Last Year:    • Ran Out of Food in the Last Year:    Transportation Needs:    • Lack of Transportation (Medical):    • Lack of Transportation (Non-Medical):    Physical Activity:    • Days of Exercise per Week:    • Minutes of Exercise per Session:    Stress:    • Feeling of Stress :    Social Connections:    • Frequency of Communication with Friends and Family:    • Frequency of Social Gatherings with Friends and Family:    • Attends [employer] Services:    • Active Member of Clubs or Organizations:    • Attends Club or Organization Meetings:    • Marital Status:    Intimate Partner Violence:    • Fear of Current or Ex-Partner:    • Emotionally Abused:    • Physically Abused:    • Sexually Abused:       Social History     Substance and Sexual Activity   Drug Use No       CURRENT MEDICATIONS  Home Medications     Reviewed by Dejon Lawton R.N. (Registered Nurse) on 05/30/21 at 1610  Med List Status: Partial   Medication Last Dose Status   ALOE VERA JUICE PO  Active   aspirin (ASA) 325 MG TABS 5/29/2021 Active   Diclofenac Sodium 1 % Gel  Active   dimenhyDRINATE (DRAMAMINE PO)  Active   ibuprofen (MOTRIN) 800 MG Tab  Active   losartan (COZAAR) 100 MG Tab 5/29/2021 Active   NON SPECIFIED  Active   Vitamin D-Vitamin K (K2 PLUS D3 PO)  Active                ALLERGIES  Allergies  "  Allergen Reactions   • Codeine      vomit       PHYSICAL EXAM  VITAL SIGNS: BP (!) 162/75   Pulse 74   Temp 36.4 °C (97.5 °F) (Temporal)   Resp 17   Ht 1.6 m (5' 3\")   Wt 90.7 kg (200 lb)   SpO2 97%   BMI 35.43 kg/m²   Pulse ox interpretation: I interpret this pulse ox as normal.    Constitutional: Alert in no apparent distress.  HENT: No signs of trauma, Bilateral external ears normal, Nose normal.   Eyes: Pupils are equal and reactive, Conjunctiva normal, Non-icteric.   Neck: Normal range of motion, No tenderness, Supple, No stridor.   Lymphatic: No lymphadenopathy noted.   Cardiovascular: Regular rate and rhythm, no murmurs.   Thorax & Lungs: Normal breath sounds, No respiratory distress, No wheezing, No chest tenderness.   Abdomen: Bowel sounds normal, Soft, No tenderness, No masses, No pulsatile masses. No peritoneal signs.  Skin: Warm, Dry, No erythema, No rash.   Back: No bony tenderness, No CVA tenderness.   Extremities: Intact distal pulses, No edema,   Musculoskeletal: Tenderness with positive sulcus sign over the left shoulder, nontender through the elbow, nontender through the clavicle, spasm noted over the trapezius, otherwise good range of motion in all major joints. No tenderness to palpation or major deformities noted.   Neurologic: Alert , Normal motor function, Normal sensory function, No focal deficits noted.   Psychiatric: Affect normal, Judgment normal, Mood normal.     DIAGNOSTIC STUDIES / PROCEDURES      RADIOLOGY  DX-SHOULDER 2+ LEFT   Final Result      Satisfactory closed reduction of left shoulder dislocation.      DX-SHOULDER 2+ LEFT   Final Result      Anterior shoulder dislocation.      Subtle lucency in the glenoid is indeterminate however cannot exclude nondisplaced fracture.        The radiologist's interpretation of all radiological studies have been reviewed and images independently viewed by me.    COURSE & MEDICAL DECISION MAKING  Nursing notes, VS, PMSFHx reviewed in " chart.    12:59 PM Patient seen and examined at bedside. Patient will be treated with . Ordered for cure to evaluate her symptoms.     1:20  Initial trial of massage, gentle manipulation, however no reduction, will proceed to procedural sedation and reduction    3:02 PM  Joint Reduction Procedure    Indication: Joint dislocation    Consent: The patient was counseled regarding the procedure, it's indications, risks, potential complications and alternatives and any questions were answered. Consent was obtained.    Procedure: The pre-reduction exam showed distal perfusion & neurologic function to be normal. The patient was placed in the sitting position. Anesthesia/pain control was obtained using conscious sedation with propofol intravenously. Reduction of the left shoulder was performed by kocher. Post reduction films were obtained and revealed satisfactory reduction. A post-reduction exam revealed distal perfusion & neurologic function to be normal. The affected area was immobilized with an arm sling.    The patient tolerated the procedure well.    Complications: None    Conscious Sedation Procedure Note    Indication: Shoulder reduction    Consent: I have discussed with the patient and/or the patient representative the indication, alternatives, and the possible risks and/or complications of the planned procedure and the anesthesia methods. The patient and/or patient representative appear to understand and agree to proceed.    Pre-Sedation Documentation and Exam: I have personally completed a history, physical exam & review of systems for this patient (see notes).  Airway Assessment: Mallampati Class I - (soft palate, fauces, uvula & anterior/posterior tonsillar pillars are visible)    Prior History of Anesthesia Complications: none    ASA Classification: Class 1 - A normal healthy patient    Sedation/ Anesthesia Plan: intravenous sedation    Medications Used: propofol intravenously    Monitoring and Safety:  The patient was placed on a cardiac monitor and vital signs, pulse oximetry and level of consciousness were continuously evaluated throughout the procedure. The patient was closely monitored until recovery from the medications was complete and the patient had returned to baseline status. Respiratory therapy was on standby at all times during the procedure.    (The following sections must be completed)  Post-Sedation Vital Signs: Vital signs were reviewed and were stable after the procedure (see flow sheet for vitals)            Post-Sedation Exam: Lungs: clear and Cardiovascular: regular rate and rhythm    4:14 PM  Patient is reevaluated, she is ambulatory, alert and oriented x3, feels much better will plan for discharge    Decision Making:  This is a 65 y.o. female presented after fall, that resulted in shoulder dislocation.  This was reduced as above.  There is no evidence of significant concomitant fracture at this time.  She is neurovascular intact.  She is placed in a sling, Norco for pain, follow-up with orthopedics, there is no other evidence of significant trauma on her exam or per her history    I reviewed prescription monitoring program for patient's narcotic use before prescribing a scheduled drug.The patient will not drink alcohol nor drive with prescribed medications. The patient will return for new or worsening symptoms and is stable at the time of discharge.    The patient is referred to a primary physician for blood pressure management, diabetic screening, and for all other preventative health concerns.    In prescribing controlled substances to this patient, I certify that I have obtained and reviewed the medical history of Giorgi Whitlock. I have also made a good may effort to obtain applicable records from other providers who have treated the patient and records did not demonstrate any increased risk of substance abuse that would prevent me from prescribing controlled substances.      I have conducted a physical exam and documented it. I have reviewed Ms. Phillip Whitlock’s prescription history as maintained by the [place] Prescription Monitoring Program.     I have assessed the patient’s risk for abuse, dependency, and addiction using the validated Opioid Risk Tool available at https://www.mdcalc.com/eucxcd-ixcd-xmkk-ort-narcotic-abuse.     Given the above, I believe the benefits of controlled substance therapy outweigh the risks. The reasons for prescribing controlled substances include non-narcotic, oral analgesic alternatives have been inadequate for pain control. Accordingly, I have discussed the risk and benefits, treatment plan, and alternative therapies with the patient.       DISPOSITION:  Patient will be discharged home in stable condition.    FOLLOW UP:  Des Henson M.D.  [address] [facility] [address]  [phone]    In 1 week        OUTPATIENT MEDICATIONS:  New Prescriptions    HYDROCODONE-ACETAMINOPHEN (NORCO) 5-325 MG TAB PER TABLET    Take 1 tablet by mouth every 6 hours as needed for up to 3 days.         FINAL IMPRESSION  1. Dislocation of left shoulder joint, initial encounter    2. Fall, initial encounter         The note accurately reflects work and decisions made by me.  Jay Conner M.D.  5/30/2021  4:15 PM

## 2021-05-30 NOTE — FLOWSHEET NOTE
05/30/21 1507   Vital Signs   Pulse 74   Respiration 17   Pulse Oximetry 97 %   CO2 Monitoring   ETCO2 (mmHg) 40   $ ETCO2 Monitoring Yes   Respiratory Assessment   Level of Consciousness Alert   Chest Exam   Work Of Breathing / Effort Mild   This RT present for conscious sedation. BVM, suction, supplemental O2 available at bedside. Pt placed on 2L and titrated to RA without complication, pt also able to maintain a patent airway throughout reduction procedure.

## 2021-05-30 NOTE — ED NOTES
Pt is alert and oriented, speaking in full sentences, follows commands and responds appropriately to questions. NAD. Resp are even and unlabored.     Patient and staff wearing appropriate PPE

## 2022-12-09 ENCOUNTER — TELEPHONE (OUTPATIENT)
Dept: MEDICAL GROUP | Facility: MEDICAL CENTER | Age: 67
End: 2022-12-09
Payer: COMMERCIAL

## 2023-01-12 ENCOUNTER — TELEPHONE (OUTPATIENT)
Dept: HEALTH INFORMATION MANAGEMENT | Facility: OTHER | Age: 68
End: 2023-01-12
Payer: MEDICARE

## 2023-07-25 PROBLEM — R73.03 PREDIABETES: Status: ACTIVE | Noted: 2019-06-19

## 2023-10-18 ENCOUNTER — HOSPITAL ENCOUNTER (OUTPATIENT)
Dept: RADIOLOGY | Facility: MEDICAL CENTER | Age: 68
End: 2023-10-18
Attending: INTERNAL MEDICINE
Payer: MEDICARE

## 2023-10-18 DIAGNOSIS — Z12.31 VISIT FOR SCREENING MAMMOGRAM: ICD-10-CM

## 2023-10-18 PROCEDURE — 77063 BREAST TOMOSYNTHESIS BI: CPT

## 2024-05-11 ENCOUNTER — HOSPITAL ENCOUNTER (OUTPATIENT)
Facility: MEDICAL CENTER | Age: 69
End: 2024-05-11
Attending: PHYSICIAN ASSISTANT
Payer: MEDICARE

## 2024-05-11 ENCOUNTER — OFFICE VISIT (OUTPATIENT)
Dept: URGENT CARE | Facility: PHYSICIAN GROUP | Age: 69
End: 2024-05-11
Payer: MEDICARE

## 2024-05-11 VITALS
DIASTOLIC BLOOD PRESSURE: 76 MMHG | HEART RATE: 75 BPM | OXYGEN SATURATION: 97 % | SYSTOLIC BLOOD PRESSURE: 128 MMHG | BODY MASS INDEX: 30.98 KG/M2 | TEMPERATURE: 96.7 F | HEIGHT: 64 IN | RESPIRATION RATE: 14 BRPM | WEIGHT: 181.44 LBS

## 2024-05-11 DIAGNOSIS — N30.01 ACUTE CYSTITIS WITH HEMATURIA: ICD-10-CM

## 2024-05-11 DIAGNOSIS — R30.0 DYSURIA: ICD-10-CM

## 2024-05-11 LAB
APPEARANCE UR: NORMAL
BILIRUB UR STRIP-MCNC: NEGATIVE MG/DL
COLOR UR AUTO: NORMAL
GLUCOSE UR STRIP.AUTO-MCNC: NEGATIVE MG/DL
KETONES UR STRIP.AUTO-MCNC: NEGATIVE MG/DL
LEUKOCYTE ESTERASE UR QL STRIP.AUTO: NORMAL
NITRITE UR QL STRIP.AUTO: POSITIVE
PH UR STRIP.AUTO: 6.5 [PH] (ref 5–8)
PROT UR QL STRIP: 30 MG/DL
RBC UR QL AUTO: NORMAL
SP GR UR STRIP.AUTO: 1.02
UROBILINOGEN UR STRIP-MCNC: 1 MG/DL

## 2024-05-11 PROCEDURE — 81002 URINALYSIS NONAUTO W/O SCOPE: CPT | Performed by: PHYSICIAN ASSISTANT

## 2024-05-11 PROCEDURE — 3078F DIAST BP <80 MM HG: CPT | Performed by: PHYSICIAN ASSISTANT

## 2024-05-11 PROCEDURE — 99213 OFFICE O/P EST LOW 20 MIN: CPT | Performed by: PHYSICIAN ASSISTANT

## 2024-05-11 PROCEDURE — 3074F SYST BP LT 130 MM HG: CPT | Performed by: PHYSICIAN ASSISTANT

## 2024-05-11 RX ORDER — CEFDINIR 300 MG/1
300 CAPSULE ORAL EVERY 12 HOURS
Qty: 10 CAPSULE | Refills: 0 | Status: SHIPPED | OUTPATIENT
Start: 2024-05-11 | End: 2024-05-16

## 2024-05-11 NOTE — PROGRESS NOTES
Chief Complaint   Patient presents with    UTI     Pt thinks she might have a bladder infection, blood in her urine, urine frequency, no burning,  sx started last night       HISTORY OF PRESENT ILLNESS: Patient is a 68 y.o. female who presents today because dysuria and hematuria that started last night.  She states that she did go into her hot tub 3 days ago but changed her suit quite quickly after.  She denies any diarrhea or change in bowel habits.  She has a distant history of urinary tract infection.  She does describe some suprapubic abdominal pain but no nausea or vomiting or fever or chills.  She denies flank pain.  She is not allergic to any antibiotics.    Patient Active Problem List    Diagnosis Date Noted    TIA (transient ischemic attack) 07/22/2020    History of total hysterectomy with bilateral salpingo-oophorectomy (BSO) 07/22/2020    Obesity (BMI 30-39.9) 06/19/2019    HTN (hypertension) 07/20/2012    PFO (patent foramen ovale), noted on echo 2012 07/20/2012    Persistent headaches 06/14/2012       Allergies:Codeine    Current Outpatient Medications Ordered in Epic   Medication Sig Dispense Refill    hydrochlorothiazide (MICROZIDE) 12.5 MG capsule       ibuprofen (MOTRIN) 800 MG Tab Take 800 mg by mouth.      NON SPECIFIED focust factor supplement      dimenhyDRINATE (DRAMAMINE PO) Take  by mouth.      losartan (COZAAR) 100 MG Tab TAKE ONE TABLET BY MOUTH DAILY 90 Tab 2    ALOE VERA JUICE PO Take  by mouth.      aspirin (ASA) 325 MG TABS Take 325 mg by mouth every day.       No current [facility]-ordered facility-administered medications on file.       Past Medical History:   Diagnosis Date    Arthritis     Family history of total abdominal hysterectomy and bilateral salpingo-oophorectomy 7/22/2020    Heart abnormality     patent foramen ovale    Heart burn     joints, neck (buldging discs)    Hypertension     Infectious disease 2016    bronchitis    Insulin resistance 2016    Paralysis (HCC) 2016     "facial paralysis x 3, Pt states\" may be related to possible lupus\"    PFO (patent foramen ovale)     Stroke (HCC) 2012    TIA       Social History     Tobacco Use    Smoking status: Never    Smokeless tobacco: Never   Substance Use Topics    Alcohol use: Yes     Comment: 1-2 glasses of wine 3-4 times a week    Drug use: No       Family Status   Relation Name Status    Mo SLE     Fa  (Not Specified)    PAunt  (Not Specified)    PGMo  (Not Specified)    PAunt  (Not Specified)     Family History   Problem Relation Age of Onset    Clotting Disorder Mother     Lupus Mother     Heart Attack Father 65    Heart Disease Father     Lung Disease Father     Cancer Paternal Aunt 52        ovarian ca    Cancer Paternal Grandmother 45        cervical    Cancer Paternal Aunt 48        ovarian ca       Review of Systems   Genitourinary:  Positive for dysuria.   All other systems reviewed and are negative.       Exam:  /76 (BP Location: Right arm, Patient Position: Sitting, BP Cuff Size: Adult long)   Pulse 75   Temp 35.9 °C (96.7 °F) (Temporal)   Resp 14   Ht 1.626 m (5' 4\")   Wt 82.3 kg (181 lb 7 oz)   SpO2 97%     Constitutional:   Appropriately groomed, pleasant affect, well nourished, in NAD.    Head:   Normocephalic, atraumatic.    Eyes:   EOM's full, sclera white, conjunctiva not erythematous, and medial canthus without exudate bilaterally.    Throat:  Dentition wnl, mucosa moist without lesions.      Lungs:  Respiratory effort not labored without accessory muscle use.      Abdominal:  Abdomen soft to palpation with mild suprapubic ttp.  No masses or hepatosplenomegaly.  No CVA tenderness bilaterally to percussion.    Musculoskeletal:  Gait nonantalgic with a narrow base.    Derm:  Skin without rashes or lesions with good turgor pressure.      Psychiatric:  Mood, affect, and judgement appropriate.        Please note that this dictation was created using voice recognition software. I have made every " reasonable attempt to correct obvious errors, but I expect that there are errors of grammar and possibly content that I did not discover before finalizing the note.    Assessment/Plan:  1. Dysuria  URINE CULTURE(NEW)    POCT Urinalysis      2. Acute cystitis with hematuria  cefdinir (OMNICEF) 300 MG Cap        Patient presents with acute cystitis.  Plan to treat with cefdinir and will send  for culture.  Recommended pushing fluids.    Instructed to return to Urgent Care or nearest Emergency Department if symptoms fail to improve, for any change in condition, further concerns, or new concerning symptoms. Patient states understanding of the plan of care and discharge instructions.    Chinedu Tovar PA-C

## 2024-05-13 LAB
BACTERIA UR CULT: ABNORMAL
BACTERIA UR CULT: ABNORMAL
SIGNIFICANT IND 70042: ABNORMAL
SITE SITE: ABNORMAL
SOURCE SOURCE: ABNORMAL

## 2024-07-16 ENCOUNTER — HOSPITAL ENCOUNTER (OUTPATIENT)
Dept: RADIOLOGY | Facility: MEDICAL CENTER | Age: 69
End: 2024-07-16
Attending: PHYSICIAN ASSISTANT
Payer: MEDICARE

## 2024-07-16 DIAGNOSIS — M79.662 PAIN OF LEFT LOWER LEG: ICD-10-CM

## 2024-07-16 DIAGNOSIS — M25.562 ACUTE PAIN OF LEFT KNEE: ICD-10-CM

## 2024-07-16 PROCEDURE — 93971 EXTREMITY STUDY: CPT

## 2024-09-04 ENCOUNTER — PATIENT MESSAGE (OUTPATIENT)
Dept: HEALTH INFORMATION MANAGEMENT | Facility: OTHER | Age: 69
End: 2024-09-04

## 2024-10-02 ENCOUNTER — APPOINTMENT (OUTPATIENT)
Dept: RADIOLOGY | Facility: MEDICAL CENTER | Age: 69
DRG: 092 | End: 2024-10-02
Attending: EMERGENCY MEDICINE
Payer: MEDICARE

## 2024-10-02 ENCOUNTER — HOSPITAL ENCOUNTER (INPATIENT)
Facility: MEDICAL CENTER | Age: 69
LOS: 1 days | DRG: 092 | End: 2024-10-03
Attending: EMERGENCY MEDICINE | Admitting: HOSPITALIST
Payer: MEDICARE

## 2024-10-02 ENCOUNTER — OFFICE VISIT (OUTPATIENT)
Dept: URGENT CARE | Facility: CLINIC | Age: 69
End: 2024-10-02
Payer: MEDICARE

## 2024-10-02 VITALS
OXYGEN SATURATION: 97 % | TEMPERATURE: 98.7 F | WEIGHT: 182 LBS | BODY MASS INDEX: 31.07 KG/M2 | HEART RATE: 60 BPM | DIASTOLIC BLOOD PRESSURE: 86 MMHG | HEIGHT: 64 IN | SYSTOLIC BLOOD PRESSURE: 114 MMHG

## 2024-10-02 DIAGNOSIS — G45.9 TIA (TRANSIENT ISCHEMIC ATTACK): ICD-10-CM

## 2024-10-02 DIAGNOSIS — R20.2 PARESTHESIAS: ICD-10-CM

## 2024-10-02 DIAGNOSIS — I63.9 ACUTE CVA (CEREBROVASCULAR ACCIDENT) (HCC): ICD-10-CM

## 2024-10-02 LAB
ABO + RH BLD: NORMAL
ABO GROUP BLD: NORMAL
ALBUMIN SERPL BCP-MCNC: 4 G/DL (ref 3.2–4.9)
ALBUMIN/GLOB SERPL: 1.3 G/DL
ALP SERPL-CCNC: 84 U/L (ref 30–99)
ALT SERPL-CCNC: 9 U/L (ref 2–50)
ANION GAP SERPL CALC-SCNC: 10 MMOL/L (ref 7–16)
APTT PPP: 27.7 SEC (ref 24.7–36)
AST SERPL-CCNC: 24 U/L (ref 12–45)
BASOPHILS # BLD AUTO: 0.6 % (ref 0–1.8)
BASOPHILS # BLD: 0.04 K/UL (ref 0–0.12)
BILIRUB SERPL-MCNC: 1.1 MG/DL (ref 0.1–1.5)
BLD GP AB SCN SERPL QL: NORMAL
BUN SERPL-MCNC: 14 MG/DL (ref 8–22)
CALCIUM ALBUM COR SERPL-MCNC: 9.1 MG/DL (ref 8.5–10.5)
CALCIUM SERPL-MCNC: 9.1 MG/DL (ref 8.4–10.2)
CHLORIDE SERPL-SCNC: 104 MMOL/L (ref 96–112)
CO2 SERPL-SCNC: 23 MMOL/L (ref 20–33)
CREAT SERPL-MCNC: 0.4 MG/DL (ref 0.5–1.4)
EKG IMPRESSION: NORMAL
EOSINOPHIL # BLD AUTO: 0.15 K/UL (ref 0–0.51)
EOSINOPHIL NFR BLD: 2.4 % (ref 0–6.9)
ERYTHROCYTE [DISTWIDTH] IN BLOOD BY AUTOMATED COUNT: 41.6 FL (ref 35.9–50)
GFR SERPLBLD CREATININE-BSD FMLA CKD-EPI: 108 ML/MIN/1.73 M 2
GLOBULIN SER CALC-MCNC: 3.1 G/DL (ref 1.9–3.5)
GLUCOSE BLD STRIP.AUTO-MCNC: 81 MG/DL (ref 65–99)
GLUCOSE SERPL-MCNC: 87 MG/DL (ref 65–99)
HCT VFR BLD AUTO: 42.6 % (ref 37–47)
HGB BLD-MCNC: 14.6 G/DL (ref 12–16)
IMM GRANULOCYTES # BLD AUTO: 0.02 K/UL (ref 0–0.11)
IMM GRANULOCYTES NFR BLD AUTO: 0.3 % (ref 0–0.9)
INR PPP: 0.96 (ref 0.87–1.13)
LYMPHOCYTES # BLD AUTO: 2.43 K/UL (ref 1–4.8)
LYMPHOCYTES NFR BLD: 38.8 % (ref 22–41)
MCH RBC QN AUTO: 31.8 PG (ref 27–33)
MCHC RBC AUTO-ENTMCNC: 34.3 G/DL (ref 32.2–35.5)
MCV RBC AUTO: 92.8 FL (ref 81.4–97.8)
MONOCYTES # BLD AUTO: 0.44 K/UL (ref 0–0.85)
MONOCYTES NFR BLD AUTO: 7 % (ref 0–13.4)
NEUTROPHILS # BLD AUTO: 3.19 K/UL (ref 1.82–7.42)
NEUTROPHILS NFR BLD: 50.9 % (ref 44–72)
NRBC # BLD AUTO: 0 K/UL
NRBC BLD-RTO: 0 /100 WBC (ref 0–0.2)
PLATELET # BLD AUTO: 240 K/UL (ref 164–446)
PMV BLD AUTO: 9.7 FL (ref 9–12.9)
POTASSIUM SERPL-SCNC: 4.1 MMOL/L (ref 3.6–5.5)
PROT SERPL-MCNC: 7.1 G/DL (ref 6–8.2)
PROTHROMBIN TIME: 13.1 SEC (ref 12–14.6)
RBC # BLD AUTO: 4.59 M/UL (ref 4.2–5.4)
RH BLD: NORMAL
SODIUM SERPL-SCNC: 137 MMOL/L (ref 135–145)
TROPONIN T SERPL-MCNC: <6 NG/L (ref 6–19)
WBC # BLD AUTO: 6.3 K/UL (ref 4.8–10.8)

## 2024-10-02 PROCEDURE — A9270 NON-COVERED ITEM OR SERVICE: HCPCS | Performed by: HOSPITALIST

## 2024-10-02 PROCEDURE — 0042T CT-CEREBRAL PERFUSION ANALYSIS: CPT

## 2024-10-02 PROCEDURE — 85610 PROTHROMBIN TIME: CPT

## 2024-10-02 PROCEDURE — 85025 COMPLETE CBC W/AUTO DIFF WBC: CPT

## 2024-10-02 PROCEDURE — 94760 N-INVAS EAR/PLS OXIMETRY 1: CPT

## 2024-10-02 PROCEDURE — 71275 CT ANGIOGRAPHY CHEST: CPT

## 2024-10-02 PROCEDURE — 80053 COMPREHEN METABOLIC PANEL: CPT

## 2024-10-02 PROCEDURE — 700117 HCHG RX CONTRAST REV CODE 255: Performed by: EMERGENCY MEDICINE

## 2024-10-02 PROCEDURE — 84484 ASSAY OF TROPONIN QUANT: CPT

## 2024-10-02 PROCEDURE — 86901 BLOOD TYPING SEROLOGIC RH(D): CPT

## 2024-10-02 PROCEDURE — 770020 HCHG ROOM/CARE - TELE (206)

## 2024-10-02 PROCEDURE — 36415 COLL VENOUS BLD VENIPUNCTURE: CPT

## 2024-10-02 PROCEDURE — 70496 CT ANGIOGRAPHY HEAD: CPT

## 2024-10-02 PROCEDURE — 99223 1ST HOSP IP/OBS HIGH 75: CPT | Mod: AI | Performed by: HOSPITALIST

## 2024-10-02 PROCEDURE — 70450 CT HEAD/BRAIN W/O DYE: CPT

## 2024-10-02 PROCEDURE — 70498 CT ANGIOGRAPHY NECK: CPT

## 2024-10-02 PROCEDURE — 700111 HCHG RX REV CODE 636 W/ 250 OVERRIDE (IP): Performed by: HOSPITALIST

## 2024-10-02 PROCEDURE — 96372 THER/PROPH/DIAG INJ SC/IM: CPT

## 2024-10-02 PROCEDURE — 700102 HCHG RX REV CODE 250 W/ 637 OVERRIDE(OP): Performed by: HOSPITALIST

## 2024-10-02 PROCEDURE — 86900 BLOOD TYPING SEROLOGIC ABO: CPT

## 2024-10-02 PROCEDURE — 99285 EMERGENCY DEPT VISIT HI MDM: CPT

## 2024-10-02 PROCEDURE — 71045 X-RAY EXAM CHEST 1 VIEW: CPT

## 2024-10-02 PROCEDURE — 99214 OFFICE O/P EST MOD 30 MIN: CPT | Performed by: FAMILY MEDICINE

## 2024-10-02 PROCEDURE — 700105 HCHG RX REV CODE 258: Performed by: HOSPITALIST

## 2024-10-02 PROCEDURE — 3074F SYST BP LT 130 MM HG: CPT | Performed by: FAMILY MEDICINE

## 2024-10-02 PROCEDURE — 3079F DIAST BP 80-89 MM HG: CPT | Performed by: FAMILY MEDICINE

## 2024-10-02 PROCEDURE — 93005 ELECTROCARDIOGRAM TRACING: CPT | Performed by: EMERGENCY MEDICINE

## 2024-10-02 PROCEDURE — 85730 THROMBOPLASTIN TIME PARTIAL: CPT

## 2024-10-02 PROCEDURE — 86850 RBC ANTIBODY SCREEN: CPT

## 2024-10-02 PROCEDURE — 82962 GLUCOSE BLOOD TEST: CPT

## 2024-10-02 RX ORDER — OXYCODONE HYDROCHLORIDE 5 MG/1
2.5 TABLET ORAL
Status: DISCONTINUED | OUTPATIENT
Start: 2024-10-02 | End: 2024-10-03 | Stop reason: HOSPADM

## 2024-10-02 RX ORDER — ONDANSETRON 4 MG/1
4 TABLET, ORALLY DISINTEGRATING ORAL EVERY 4 HOURS PRN
Status: DISCONTINUED | OUTPATIENT
Start: 2024-10-02 | End: 2024-10-03 | Stop reason: HOSPADM

## 2024-10-02 RX ORDER — LABETALOL HYDROCHLORIDE 5 MG/ML
10 INJECTION, SOLUTION INTRAVENOUS
Status: DISCONTINUED | OUTPATIENT
Start: 2024-10-02 | End: 2024-10-03 | Stop reason: HOSPADM

## 2024-10-02 RX ORDER — OXYCODONE HYDROCHLORIDE 5 MG/1
5 TABLET ORAL
Status: DISCONTINUED | OUTPATIENT
Start: 2024-10-02 | End: 2024-10-03 | Stop reason: HOSPADM

## 2024-10-02 RX ORDER — ASPIRIN 325 MG
325 TABLET ORAL EVERY EVENING
Status: DISCONTINUED | OUTPATIENT
Start: 2024-10-02 | End: 2024-10-03 | Stop reason: HOSPADM

## 2024-10-02 RX ORDER — ONDANSETRON 2 MG/ML
4 INJECTION INTRAMUSCULAR; INTRAVENOUS EVERY 4 HOURS PRN
Status: DISCONTINUED | OUTPATIENT
Start: 2024-10-02 | End: 2024-10-03 | Stop reason: HOSPADM

## 2024-10-02 RX ORDER — SODIUM CHLORIDE 9 MG/ML
500 INJECTION, SOLUTION INTRAVENOUS
Status: DISCONTINUED | OUTPATIENT
Start: 2024-10-02 | End: 2024-10-03 | Stop reason: HOSPADM

## 2024-10-02 RX ORDER — HYDROMORPHONE HYDROCHLORIDE 1 MG/ML
0.25 INJECTION, SOLUTION INTRAMUSCULAR; INTRAVENOUS; SUBCUTANEOUS
Status: DISCONTINUED | OUTPATIENT
Start: 2024-10-02 | End: 2024-10-03 | Stop reason: HOSPADM

## 2024-10-02 RX ORDER — HYDRALAZINE HYDROCHLORIDE 20 MG/ML
20 INJECTION INTRAMUSCULAR; INTRAVENOUS ONCE
Status: DISCONTINUED | OUTPATIENT
Start: 2024-10-02 | End: 2024-10-02

## 2024-10-02 RX ORDER — HEPARIN SODIUM 5000 [USP'U]/ML
5000 INJECTION, SOLUTION INTRAVENOUS; SUBCUTANEOUS EVERY 8 HOURS
Status: DISCONTINUED | OUTPATIENT
Start: 2024-10-02 | End: 2024-10-03 | Stop reason: HOSPADM

## 2024-10-02 RX ORDER — ACETAMINOPHEN 325 MG/1
650 TABLET ORAL EVERY 6 HOURS PRN
Status: DISCONTINUED | OUTPATIENT
Start: 2024-10-02 | End: 2024-10-03 | Stop reason: HOSPADM

## 2024-10-02 RX ORDER — SODIUM CHLORIDE 9 MG/ML
INJECTION, SOLUTION INTRAVENOUS CONTINUOUS
Status: DISCONTINUED | OUTPATIENT
Start: 2024-10-02 | End: 2024-10-03 | Stop reason: HOSPADM

## 2024-10-02 RX ORDER — HYDRALAZINE HYDROCHLORIDE 20 MG/ML
10 INJECTION INTRAMUSCULAR; INTRAVENOUS
Status: DISCONTINUED | OUTPATIENT
Start: 2024-10-02 | End: 2024-10-03 | Stop reason: HOSPADM

## 2024-10-02 RX ORDER — ATORVASTATIN CALCIUM 40 MG/1
80 TABLET, FILM COATED ORAL EVERY EVENING
Status: DISCONTINUED | OUTPATIENT
Start: 2024-10-02 | End: 2024-10-03 | Stop reason: HOSPADM

## 2024-10-02 RX ADMIN — IOHEXOL 40 ML: 350 INJECTION, SOLUTION INTRAVENOUS at 18:34

## 2024-10-02 RX ADMIN — HEPARIN SODIUM 5000 UNITS: 5000 INJECTION, SOLUTION INTRAVENOUS; SUBCUTANEOUS at 21:38

## 2024-10-02 RX ADMIN — SODIUM CHLORIDE: 9 INJECTION, SOLUTION INTRAVENOUS at 21:39

## 2024-10-02 RX ADMIN — IOHEXOL 80 ML: 350 INJECTION, SOLUTION INTRAVENOUS at 18:35

## 2024-10-02 RX ADMIN — ATORVASTATIN CALCIUM 80 MG: 40 TABLET, FILM COATED ORAL at 21:38

## 2024-10-02 RX ADMIN — IOHEXOL 75 ML: 350 INJECTION, SOLUTION INTRAVENOUS at 18:37

## 2024-10-02 RX ADMIN — ASPIRIN 325 MG: 325 TABLET ORAL at 22:56

## 2024-10-02 ASSESSMENT — ENCOUNTER SYMPTOMS
CARDIOVASCULAR NEGATIVE: 1
FEVER: 0
CARDIOVASCULAR NEGATIVE: 1
RESPIRATORY NEGATIVE: 1
DOUBLE VISION: 0
SEIZURES: 0
HEMOPTYSIS: 0
GASTROINTESTINAL NEGATIVE: 1
RESPIRATORY NEGATIVE: 1
MUSCULOSKELETAL NEGATIVE: 1
DIZZINESS: 0
PALPITATIONS: 0
SPEECH CHANGE: 1
WHEEZING: 0
FOCAL WEAKNESS: 1
SPEECH CHANGE: 0
FACIAL SWELLING: 1
CONSTIPATION: 0
HEARTBURN: 0
EYES NEGATIVE: 1
NAUSEA: 0
WEAKNESS: 1
HEADACHES: 0
PSYCHIATRIC NEGATIVE: 1
BRUISES/BLEEDS EASILY: 0
DIAPHORESIS: 0
CONSTITUTIONAL NEGATIVE: 1
BLOOD IN STOOL: 0
HEADACHES: 0
CONSTITUTIONAL NEGATIVE: 1
TINGLING: 0
CHILLS: 0
EYES NEGATIVE: 1
VOMITING: 0
GASTROINTESTINAL NEGATIVE: 1
DIZZINESS: 0
ABDOMINAL PAIN: 0
LOSS OF CONSCIOUSNESS: 0
DIARRHEA: 0
NERVOUS/ANXIOUS: 0
FOCAL WEAKNESS: 1
SENSORY CHANGE: 1
COUGH: 0

## 2024-10-02 ASSESSMENT — VISUAL ACUITY: OU: 1

## 2024-10-03 ENCOUNTER — APPOINTMENT (OUTPATIENT)
Dept: RADIOLOGY | Facility: MEDICAL CENTER | Age: 69
DRG: 092 | End: 2024-10-03
Attending: HOSPITALIST
Payer: MEDICARE

## 2024-10-03 ENCOUNTER — APPOINTMENT (OUTPATIENT)
Dept: CARDIOLOGY | Facility: MEDICAL CENTER | Age: 69
DRG: 092 | End: 2024-10-03
Attending: HOSPITALIST
Payer: MEDICARE

## 2024-10-03 VITALS
TEMPERATURE: 97.5 F | RESPIRATION RATE: 18 BRPM | SYSTOLIC BLOOD PRESSURE: 147 MMHG | BODY MASS INDEX: 30.86 KG/M2 | WEIGHT: 180.78 LBS | HEIGHT: 64 IN | HEART RATE: 64 BPM | DIASTOLIC BLOOD PRESSURE: 86 MMHG | OXYGEN SATURATION: 98 %

## 2024-10-03 LAB
ANION GAP SERPL CALC-SCNC: 11 MMOL/L (ref 7–16)
BUN SERPL-MCNC: 11 MG/DL (ref 8–22)
CALCIUM SERPL-MCNC: 8.9 MG/DL (ref 8.4–10.2)
CHLORIDE SERPL-SCNC: 106 MMOL/L (ref 96–112)
CHOLEST SERPL-MCNC: 188 MG/DL (ref 100–199)
CO2 SERPL-SCNC: 22 MMOL/L (ref 20–33)
CREAT SERPL-MCNC: 0.39 MG/DL (ref 0.5–1.4)
ERYTHROCYTE [DISTWIDTH] IN BLOOD BY AUTOMATED COUNT: 40.7 FL (ref 35.9–50)
EST. AVERAGE GLUCOSE BLD GHB EST-MCNC: 114 MG/DL
GFR SERPLBLD CREATININE-BSD FMLA CKD-EPI: 109 ML/MIN/1.73 M 2
GLUCOSE SERPL-MCNC: 90 MG/DL (ref 65–99)
HBA1C MFR BLD: 5.6 % (ref 4–5.6)
HCT VFR BLD AUTO: 39.4 % (ref 37–47)
HDLC SERPL-MCNC: 89 MG/DL
HGB BLD-MCNC: 13.4 G/DL (ref 12–16)
LDLC SERPL CALC-MCNC: 86 MG/DL
LV EJECT FRACT  99904: 65
LV EJECT FRACT MOD 2C 99903: 65.1
LV EJECT FRACT MOD 4C 99902: 68.39
LV EJECT FRACT MOD BP 99901: 66.56
MCH RBC QN AUTO: 31.5 PG (ref 27–33)
MCHC RBC AUTO-ENTMCNC: 34 G/DL (ref 32.2–35.5)
MCV RBC AUTO: 92.7 FL (ref 81.4–97.8)
PLATELET # BLD AUTO: 203 K/UL (ref 164–446)
PMV BLD AUTO: 9.4 FL (ref 9–12.9)
POTASSIUM SERPL-SCNC: 3.8 MMOL/L (ref 3.6–5.5)
RBC # BLD AUTO: 4.25 M/UL (ref 4.2–5.4)
SODIUM SERPL-SCNC: 139 MMOL/L (ref 135–145)
TRIGL SERPL-MCNC: 67 MG/DL (ref 0–149)
WBC # BLD AUTO: 6 K/UL (ref 4.8–10.8)

## 2024-10-03 PROCEDURE — 97162 PT EVAL MOD COMPLEX 30 MIN: CPT

## 2024-10-03 PROCEDURE — 700111 HCHG RX REV CODE 636 W/ 250 OVERRIDE (IP): Performed by: HOSPITALIST

## 2024-10-03 PROCEDURE — 85027 COMPLETE CBC AUTOMATED: CPT

## 2024-10-03 PROCEDURE — A9270 NON-COVERED ITEM OR SERVICE: HCPCS | Performed by: HOSPITALIST

## 2024-10-03 PROCEDURE — 99239 HOSP IP/OBS DSCHRG MGMT >30: CPT | Performed by: INTERNAL MEDICINE

## 2024-10-03 PROCEDURE — 700102 HCHG RX REV CODE 250 W/ 637 OVERRIDE(OP): Performed by: HOSPITALIST

## 2024-10-03 PROCEDURE — 70551 MRI BRAIN STEM W/O DYE: CPT

## 2024-10-03 PROCEDURE — 80061 LIPID PANEL: CPT

## 2024-10-03 PROCEDURE — 36415 COLL VENOUS BLD VENIPUNCTURE: CPT

## 2024-10-03 PROCEDURE — 80048 BASIC METABOLIC PNL TOTAL CA: CPT

## 2024-10-03 PROCEDURE — 83036 HEMOGLOBIN GLYCOSYLATED A1C: CPT

## 2024-10-03 PROCEDURE — 700105 HCHG RX REV CODE 258: Performed by: HOSPITALIST

## 2024-10-03 PROCEDURE — 93306 TTE W/DOPPLER COMPLETE: CPT

## 2024-10-03 PROCEDURE — 96372 THER/PROPH/DIAG INJ SC/IM: CPT

## 2024-10-03 PROCEDURE — 93306 TTE W/DOPPLER COMPLETE: CPT | Mod: 26 | Performed by: INTERNAL MEDICINE

## 2024-10-03 RX ADMIN — SODIUM CHLORIDE: 9 INJECTION, SOLUTION INTRAVENOUS at 09:33

## 2024-10-03 RX ADMIN — HEPARIN SODIUM 5000 UNITS: 5000 INJECTION, SOLUTION INTRAVENOUS; SUBCUTANEOUS at 05:45

## 2024-10-03 RX ADMIN — ACETAMINOPHEN 650 MG: 325 TABLET ORAL at 09:40

## 2024-10-03 RX ADMIN — HEPARIN SODIUM 5000 UNITS: 5000 INJECTION, SOLUTION INTRAVENOUS; SUBCUTANEOUS at 13:01

## 2024-10-03 SDOH — ECONOMIC STABILITY: TRANSPORTATION INSECURITY
IN THE PAST 12 MONTHS, HAS THE LACK OF TRANSPORTATION KEPT YOU FROM MEDICAL APPOINTMENTS OR FROM GETTING MEDICATIONS?: NO

## 2024-10-03 SDOH — ECONOMIC STABILITY: TRANSPORTATION INSECURITY
IN THE PAST 12 MONTHS, HAS LACK OF RELIABLE TRANSPORTATION KEPT YOU FROM MEDICAL APPOINTMENTS, MEETINGS, WORK OR FROM GETTING THINGS NEEDED FOR DAILY LIVING?: NO

## 2024-10-03 ASSESSMENT — GAIT ASSESSMENTS
DISTANCE (FEET): 75
DEVIATION: NO DEVIATION
GAIT LEVEL OF ASSIST: SUPERVISED

## 2024-10-03 ASSESSMENT — ENCOUNTER SYMPTOMS
WEAKNESS: 1
INSOMNIA: 0
ABDOMINAL PAIN: 0
CHILLS: 0
COUGH: 0
MYALGIAS: 0
CLAUDICATION: 0
NERVOUS/ANXIOUS: 0
BLURRED VISION: 0
SENSORY CHANGE: 0
SHORTNESS OF BREATH: 0
HEARTBURN: 0
CONSTIPATION: 0
FEVER: 0
VOMITING: 0
DEPRESSION: 0
DIARRHEA: 0
PHOTOPHOBIA: 0
HEADACHES: 0
SPEECH CHANGE: 1
FOCAL WEAKNESS: 1
DIZZINESS: 0

## 2024-10-03 ASSESSMENT — COGNITIVE AND FUNCTIONAL STATUS - GENERAL
MOBILITY SCORE: 24
DRESSING REGULAR LOWER BODY CLOTHING: A LITTLE
SUGGESTED CMS G CODE MODIFIER MOBILITY: CH
DAILY ACTIVITIY SCORE: 23
SUGGESTED CMS G CODE MODIFIER DAILY ACTIVITY: CI
MOBILITY SCORE: 24
SUGGESTED CMS G CODE MODIFIER MOBILITY: CH

## 2024-10-03 ASSESSMENT — LIFESTYLE VARIABLES
CONSUMPTION TOTAL: NEGATIVE
DOES PATIENT WANT TO STOP DRINKING: NO
ALCOHOL_USE: YES
HAVE PEOPLE ANNOYED YOU BY CRITICIZING YOUR DRINKING: NO
DOES PATIENT WANT TO TALK TO SOMEONE ABOUT QUITTING: NO
TOTAL SCORE: 0
EVER HAD A DRINK FIRST THING IN THE MORNING TO STEADY YOUR NERVES TO GET RID OF A HANGOVER: NO
TOTAL SCORE: 0
TOTAL SCORE: 0
EVER FELT BAD OR GUILTY ABOUT YOUR DRINKING: NO
HOW MANY TIMES IN THE PAST YEAR HAVE YOU HAD 5 OR MORE DRINKS IN A DAY: 0
AVERAGE NUMBER OF DAYS PER WEEK YOU HAVE A DRINK CONTAINING ALCOHOL: 7
HAVE YOU EVER FELT YOU SHOULD CUT DOWN ON YOUR DRINKING: NO
ON A TYPICAL DAY WHEN YOU DRINK ALCOHOL HOW MANY DRINKS DO YOU HAVE: 1

## 2024-10-03 ASSESSMENT — PATIENT HEALTH QUESTIONNAIRE - PHQ9
SUM OF ALL RESPONSES TO PHQ9 QUESTIONS 1 AND 2: 0
2. FEELING DOWN, DEPRESSED, IRRITABLE, OR HOPELESS: NOT AT ALL
1. LITTLE INTEREST OR PLEASURE IN DOING THINGS: NOT AT ALL

## 2024-10-03 ASSESSMENT — SOCIAL DETERMINANTS OF HEALTH (SDOH)
WITHIN THE LAST YEAR, HAVE YOU BEEN HUMILIATED OR EMOTIONALLY ABUSED IN OTHER WAYS BY YOUR PARTNER OR EX-PARTNER?: NO
IN THE PAST 12 MONTHS, HAS THE ELECTRIC, GAS, OIL, OR WATER COMPANY THREATENED TO SHUT OFF SERVICE IN YOUR HOME?: NO
WITHIN THE PAST 12 MONTHS, THE FOOD YOU BOUGHT JUST DIDN'T LAST AND YOU DIDN'T HAVE MONEY TO GET MORE: NEVER TRUE
WITHIN THE PAST 12 MONTHS, YOU WORRIED THAT YOUR FOOD WOULD RUN OUT BEFORE YOU GOT THE MONEY TO BUY MORE: NEVER TRUE
WITHIN THE LAST YEAR, HAVE TO BEEN RAPED OR FORCED TO HAVE ANY KIND OF SEXUAL ACTIVITY BY YOUR PARTNER OR EX-PARTNER?: NO
WITHIN THE LAST YEAR, HAVE YOU BEEN AFRAID OF YOUR PARTNER OR EX-PARTNER?: NO
WITHIN THE LAST YEAR, HAVE YOU BEEN KICKED, HIT, SLAPPED, OR OTHERWISE PHYSICALLY HURT BY YOUR PARTNER OR EX-PARTNER?: NO

## 2024-10-03 ASSESSMENT — PAIN DESCRIPTION - PAIN TYPE
TYPE: ACUTE PAIN
TYPE: ACUTE PAIN

## 2024-10-03 ASSESSMENT — FIBROSIS 4 INDEX: FIB4 SCORE: 2.64

## 2024-10-10 ENCOUNTER — OFFICE VISIT (OUTPATIENT)
Dept: NEUROLOGY | Facility: MEDICAL CENTER | Age: 69
End: 2024-10-10
Attending: SPECIALIST
Payer: MEDICARE

## 2024-10-10 VITALS
SYSTOLIC BLOOD PRESSURE: 116 MMHG | HEIGHT: 64 IN | HEART RATE: 76 BPM | OXYGEN SATURATION: 96 % | WEIGHT: 182.54 LBS | TEMPERATURE: 97.8 F | DIASTOLIC BLOOD PRESSURE: 84 MMHG | RESPIRATION RATE: 12 BRPM | BODY MASS INDEX: 31.16 KG/M2

## 2024-10-10 DIAGNOSIS — R27.0 ATAXIA: ICD-10-CM

## 2024-10-10 DIAGNOSIS — G45.9 TIA (TRANSIENT ISCHEMIC ATTACK): ICD-10-CM

## 2024-10-10 DIAGNOSIS — Q21.12 PFO (PATENT FORAMEN OVALE): ICD-10-CM

## 2024-10-10 PROCEDURE — 3074F SYST BP LT 130 MM HG: CPT | Performed by: SPECIALIST

## 2024-10-10 PROCEDURE — 99212 OFFICE O/P EST SF 10 MIN: CPT | Performed by: SPECIALIST

## 2024-10-10 PROCEDURE — 3079F DIAST BP 80-89 MM HG: CPT | Performed by: SPECIALIST

## 2024-10-10 PROCEDURE — 99204 OFFICE O/P NEW MOD 45 MIN: CPT | Performed by: SPECIALIST

## 2024-10-10 ASSESSMENT — PATIENT HEALTH QUESTIONNAIRE - PHQ9: CLINICAL INTERPRETATION OF PHQ2 SCORE: 0

## 2024-10-10 ASSESSMENT — FIBROSIS 4 INDEX: FIB4 SCORE: 2.64

## 2024-11-08 ENCOUNTER — TELEPHONE (OUTPATIENT)
Dept: HEALTH INFORMATION MANAGEMENT | Facility: OTHER | Age: 69
End: 2024-11-08
Payer: MEDICARE

## 2024-11-11 ASSESSMENT — ACTIVITIES OF DAILY LIVING (ADL): BATHING_REQUIRES_ASSISTANCE: 0

## 2024-11-11 ASSESSMENT — ENCOUNTER SYMPTOMS: GENERAL WELL-BEING: GOOD

## 2024-11-11 ASSESSMENT — PATIENT HEALTH QUESTIONNAIRE - PHQ9
2. FEELING DOWN, DEPRESSED, IRRITABLE, OR HOPELESS: NOT AT ALL
1. LITTLE INTEREST OR PLEASURE IN DOING THINGS: NOT AT ALL

## 2024-11-13 ENCOUNTER — OFFICE VISIT (OUTPATIENT)
Dept: FAMILY PLANNING/WOMEN'S HEALTH CLINIC | Facility: PHYSICIAN GROUP | Age: 69
End: 2024-11-13
Payer: MEDICARE

## 2024-11-13 VITALS
HEIGHT: 64 IN | BODY MASS INDEX: 31.51 KG/M2 | WEIGHT: 184.6 LBS | RESPIRATION RATE: 16 BRPM | HEART RATE: 64 BPM | DIASTOLIC BLOOD PRESSURE: 76 MMHG | SYSTOLIC BLOOD PRESSURE: 122 MMHG

## 2024-11-13 DIAGNOSIS — I70.0 ATHEROSCLEROSIS OF AORTA (HCC): ICD-10-CM

## 2024-11-13 DIAGNOSIS — R51.9 PERSISTENT HEADACHES: ICD-10-CM

## 2024-11-13 DIAGNOSIS — I77.819 AORTIC ECTASIA (HCC): ICD-10-CM

## 2024-11-13 DIAGNOSIS — I10 PRIMARY HYPERTENSION: ICD-10-CM

## 2024-11-13 DIAGNOSIS — E66.9 OBESITY (BMI 30-39.9): ICD-10-CM

## 2024-11-13 PROCEDURE — 1125F AMNT PAIN NOTED PAIN PRSNT: CPT

## 2024-11-13 PROCEDURE — G0439 PPPS, SUBSEQ VISIT: HCPCS

## 2024-11-13 PROCEDURE — 3074F SYST BP LT 130 MM HG: CPT

## 2024-11-13 PROCEDURE — 3078F DIAST BP <80 MM HG: CPT

## 2024-11-13 SDOH — ECONOMIC STABILITY: HOUSING INSECURITY: IN THE LAST 12 MONTHS, WAS THERE A TIME WHEN YOU WERE NOT ABLE TO PAY THE MORTGAGE OR RENT ON TIME?: NO

## 2024-11-13 SDOH — ECONOMIC STABILITY: TRANSPORTATION INSECURITY: IN THE PAST 12 MONTHS, HAS LACK OF TRANSPORTATION KEPT YOU FROM MEDICAL APPOINTMENTS OR FROM GETTING MEDICATIONS?: NO

## 2024-11-13 SDOH — HEALTH STABILITY: PHYSICAL HEALTH: ON AVERAGE, HOW MANY DAYS PER WEEK DO YOU ENGAGE IN MODERATE TO STRENUOUS EXERCISE (LIKE A BRISK WALK)?: 3 DAYS

## 2024-11-13 SDOH — ECONOMIC STABILITY: FOOD INSECURITY: WITHIN THE PAST 12 MONTHS, THE FOOD YOU BOUGHT JUST DIDN'T LAST AND YOU DIDN'T HAVE MONEY TO GET MORE.: NEVER TRUE

## 2024-11-13 SDOH — ECONOMIC STABILITY: HOUSING INSECURITY: AT ANY TIME IN THE PAST 12 MONTHS, WERE YOU HOMELESS OR LIVING IN A SHELTER (INCLUDING NOW)?: NO

## 2024-11-13 SDOH — ECONOMIC STABILITY: FOOD INSECURITY: WITHIN THE PAST 12 MONTHS, YOU WORRIED THAT YOUR FOOD WOULD RUN OUT BEFORE YOU GOT THE MONEY TO BUY MORE.: NEVER TRUE

## 2024-11-13 SDOH — HEALTH STABILITY: PHYSICAL HEALTH: ON AVERAGE, HOW MANY MINUTES DO YOU ENGAGE IN EXERCISE AT THIS LEVEL?: 20 MIN

## 2024-11-13 SDOH — ECONOMIC STABILITY: FOOD INSECURITY: HOW HARD IS IT FOR YOU TO PAY FOR THE VERY BASICS LIKE FOOD, HOUSING, MEDICAL CARE, AND HEATING?: NOT HARD AT ALL

## 2024-11-13 ASSESSMENT — FIBROSIS 4 INDEX: FIB4 SCORE: 2.64

## 2024-11-13 ASSESSMENT — ACTIVITIES OF DAILY LIVING (ADL): LACK_OF_TRANSPORTATION: NO

## 2024-11-13 ASSESSMENT — PAIN SCALES - GENERAL: PAINLEVEL_OUTOF10: 5=MODERATE PAIN

## 2024-11-13 ASSESSMENT — PATIENT HEALTH QUESTIONNAIRE - PHQ9: CLINICAL INTERPRETATION OF PHQ2 SCORE: 0

## 2024-11-14 NOTE — ASSESSMENT & PLAN NOTE
Chronic, stable. Noted on CT Chest (Thorax) in 2016  Last lipid panel within normal limits on 10/3/24.  Continue with blood pressure control.  Follow-up at least annually.

## 2024-11-14 NOTE — ASSESSMENT & PLAN NOTE
Chronic, stable. Noted on Echocardiogram on 10/3/2024.  Continue with current defined treatment plan: hydrochlorothiazide (MICROZIDE) 12.5 MG capsule.  Continue with blood pressure control.  Follow-up at least annually.

## 2024-11-14 NOTE — PROGRESS NOTES
Comprehensive Health Assessment Program     YESENIA CHUNG is a 69 y.o. here for her comprehensive health assessment.    Patient Active Problem List    Diagnosis Date Noted    Aortic ectasia (HCC) 11/13/2024    Atherosclerosis of aorta (HCC) 11/13/2024    Acute CVA (cerebrovascular accident) (HCC) 10/02/2024    TIA (transient ischemic attack) 07/22/2020    History of total hysterectomy with bilateral salpingo-oophorectomy (BSO) 07/22/2020    Obesity (BMI 30-39.9) 06/19/2019    HTN (hypertension) 07/20/2012    PFO (patent foramen ovale), noted on echo 2012 07/20/2012    Persistent headaches 06/14/2012       Current Outpatient Medications   Medication Sig Dispense Refill    Omega-3 Fatty Acids (OMEGA-3 FISH OIL CONCENTRATE PO) Take 1 Dose by mouth every day. Zinzino Balance Oil      Non Formulary Request Take 1 Dose by mouth every day. Zinzino Viva (+)    Vitamin C/Iodine/Magnesium/Saffron      Non Formulary Request Take 1 Dose by mouth every day. Zinzino - Xtend tablets      hydrochlorothiazide (MICROZIDE) 12.5 MG capsule       ibuprofen (MOTRIN) 800 MG Tab Take 800 mg by mouth 1 time a day as needed (Joint pain).      losartan (COZAAR) 100 MG Tab TAKE ONE TABLET BY MOUTH DAILY (Patient taking differently: Take 100 mg by mouth every evening.) 90 Tab 2    ALOE VERA JUICE PO Take  by mouth 1 time a day as needed (Heartburn).      aspirin (ASA) 325 MG TABS Take 325 mg by mouth every evening.       No current facility-administered medications for this visit.          Current supplements as per medication list.     Allergies:   Codeine  Social History     Tobacco Use    Smoking status: Never    Smokeless tobacco: Never   Vaping Use    Vaping status: Never Used   Substance Use Topics    Alcohol use: Yes     Comment: 1-2 glasses of wine 3-4 times a week    Drug use: No     Family History   Problem Relation Age of Onset    Clotting Disorder Mother     Lupus Mother     Heart Attack Father 65    Heart Disease  Father     Lung Disease Father     Cancer Paternal Aunt 52        ovarian ca    Cancer Paternal Grandmother 45        cervical    Cancer Paternal Aunt 48        ovarian ca     YESENIA  has a past medical history of Arthritis, Family history of total abdominal hysterectomy and bilateral salpingo-oophorectomy (7/22/2020), Heart abnormality, Heart burn, Hypertension, Infectious disease (2016), Insulin resistance (2016), Paralysis ([facility]) (2016), PFO (patent foramen ovale), and Stroke ([facility]) (2012).   Past Surgical History:   Procedure Laterality Date    SHOULDER ARTHROSCOPY W/ ROTATOR CUFF REPAIR Right 2/29/2016    Procedure: SHOULDER ARTHROSCOPY W/ ROTATOR CUFF REPAIR  W/DEBRIDEMENT;  Surgeon: Dejon Lawton M.D.;  Location: SURGERY [facility];  Service:     SHOULDER DECOMPRESSION ARTHROSCOPIC Right 2/29/2016    Procedure: SHOULDER DECOMPRESSION ARTHROSCOPIC - SUBACROMIAL biceps tenotomy;  Surgeon: Dejon Lawton M.D.;  Location: [facility];  Service:     RECOVERY  8/3/2012    Performed by [email or web address] at SURGERY SAME DAY [facility]    ABDOMINAL HYSTERECTOMY TOTAL  1992    total abdominal hysterectomy    BREAST BIOPSY  1992    hx of benign left breast bx.    PRIMARY C SECTION  1986    APPENDECTOMY  1969       Screening:  In the last six months have you experienced any leakage of urine? No    Depression Screening  Little interest or pleasure in doing things?  0 - not at all  Feeling down, depressed , or hopeless? 0 - not at all  Patient Health Questionnaire Score: 0     If depressive symptoms identified deferred to follow up visit unless specifically addressed in assessment and plan.    Interpretation of PHQ-9 Total Score   Score Severity   1-4 No Depression   5-9 Mild Depression   10-14 Moderate Depression   15-19 Moderately Severe Depression   20-27 Severe Depression    Screening for Cognitive Impairment  Do you or any of your friends or family members have any concern about your memory?  No  Three Minute Recall (Leader, Season, Table) 3/3    Nicko clock face with all 12 numbers and set the hands to show 10 minutes after 11.  Yes 5  Cognitive concerns identified deferred for follow up unless specifically addressed in assessment and plan.    Fall Risk Assessment  Has the patient had two or more falls in the last year or any fall with injury in the last year?  Yes    Safety Assessment  Do you always wear your seatbelt?  Yes  Any changes to home needed to function safely? No  Difficulty hearing.  Yes  Patient counseled about all safety risks that were identified.    Functional Assessment ADLs  Are there any barriers preventing you from cooking for yourself or meeting nutritional needs?  No.    Are there any barriers preventing you from driving safely or obtaining transportation?  No.    Are there any barriers preventing you from using a telephone or calling for help?  No    Are there any barriers preventing you from shopping?  No.    Are there any barriers preventing you from taking care of your own finances?  No    Are there any barriers preventing you from managing your medications?  No    Are there any barriers preventing you from showering, bathing or dressing yourself? No    Are there any barriers preventing you from doing housework or laundry? No  Are there any barriers preventing you from using the toilet?No  Are you currently engaging in any exercise or physical activity?  No.      Self-Assessment of Health  What is your perception of your health? Good    Do you sleep more than six hours a night? No    In the past 7 days, how much did pain keep you from doing your normal work? Some    Do you spend quality time with family or friends (virtually or in person)? Yes    Do you usually eat a heart healthy diet that constists of a variety of fruits, vegetables, whole grains and fiber? Yes    Do you eat foods high in fat and/or Fast Food more than three times per week? No    How concerned are you that  your medical conditions are not being well managed? Not at all    Are you worried that in the next 2 months, you may not have stable housing that you own, rent, or stay in as part of a household? No      Advance Care Planning  Do you have an Advance Directive, Living Will, Durable Power of , or POLST? Yes                 Health Maintenance Summary            Overdue - Bone Density Scan (Every 5 Years) Never done      No completion history exists for this topic.              Overdue - Hepatitis C Screening (Once) Never done      No completion history exists for this topic.              Overdue - IMM DTaP/Tdap/Td Vaccine (1 - Tdap) Never done      No completion history exists for this topic.              Overdue - Zoster (Shingles) Vaccines (1 of 2) Never done      No completion history exists for this topic.              Overdue - Pneumococcal Vaccine: 65+ Years (1 of 1 - PCV) Never done      No completion history exists for this topic.              Overdue - Influenza Vaccine (1) Never done      No completion history exists for this topic.              Overdue - COVID-19 Vaccine (1 - 2024-25 season) Never done      No completion history exists for this topic.              Mammogram (Every 2 Years) Tentatively due on 10/18/2025      10/18/2023  MA-SCREENING MAMMO BILAT W/TOMOSYNTHESIS W/CAD    07/23/2014  MA-SCREENING MAMMOGRAM W/ CAD    05/09/2013  MA-SCREENING MAMMOGRAM W/ CAD    02/23/2009  MA-SCREENING DIGITAL MAMMO    02/23/2009  MA-CAD SCREENING-MAMMO    Only the first 5 history entries have been loaded, but more history exists.              Annual Wellness Visit (Yearly) Next due on 11/13/2025 11/13/2024  Level of Service: NM ANNUAL WELLNESS VISIT-INCLUDES PPPS SUBSEQUE*    07/25/2023  Level of Service: NM ANNUAL WELLNESS VISIT-INCLUDES PPPS SUBSEQUE*              Colorectal Cancer Screening (Colon Cancer Screening Cologuard Stool (FIT DNA) - Every 3 Years) Tentatively due on 11/6/2026       11/06/2023  COLOGUARD COLON CANCER SCREENING    11/06/2023  COLOGUARD RESULT component of COLOGUARD COLON CANCER SCREENING              Hepatitis A Vaccine (Hep A) (Series Information) Aged Out      No completion history exists for this topic.              Hepatitis B Vaccine (Hep B) (Series Information) Aged Out      No completion history exists for this topic.              HPV Vaccines (Series Information) Aged Out      No completion history exists for this topic.              Polio Vaccine (Inactivated Polio) (Series Information) Aged Out      No completion history exists for this topic.              Meningococcal Immunization (Series Information) Aged Out      No completion history exists for this topic.              Discontinued - A1c Screening  Discontinued        Frequency changed to Never automatically (Topic No Longer Applies)    10/03/2024  HEMOGLOBIN A1C    07/11/2019  HEMOGLOBIN A1C              Discontinued - Fasting Lipid Profile  Discontinued        Frequency changed to Never automatically (Topic No Longer Applies)    10/03/2024  Lipid Profile    07/22/2020  LIPID PANEL    07/11/2019  LIPID PANEL    10/18/2013  LIPID PANEL    Only the first 5 history entries have been loaded, but more history exists.              Discontinued - Diabetes: Urine Protein Screening  Discontinued        Frequency changed to Never automatically (Topic No Longer Applies)    07/11/2019  MICROALB/CREAT RATIO RAND. UR              Discontinued - SERUM CREATININE  Discontinued        Frequency changed to Never automatically (Topic No Longer Applies)    10/03/2024  Basic Metabolic Panel (BMP)    10/02/2024  COMP METABOLIC PANEL    07/22/2020  Comp Metabolic Panel    07/11/2019  Comp Metabolic Panel    Only the first 5 history entries have been loaded, but more history exists.              Discontinued - Cervical Cancer Screening  Discontinued        Frequency changed to Never automatically (Topic No Longer Applies)    05/21/2013   "PAP IG, HPV-HR                    Patient Care Team:  Pcp Pt States None as PCP - General (Family Medicine)  Ai Conner M.D. as Consulting Physician (Cardiovascular Disease (Cardiology))      Financial Resource Strain: Low Risk  (11/13/2024)    Overall Financial Resource Strain (CARDIA)     Difficulty of Paying Living Expenses: Not hard at all      Transportation Needs: No Transportation Needs (11/13/2024)    PRAPARE - Transportation     Lack of Transportation (Medical): No     Lack of Transportation (Non-Medical): No      Food Insecurity: No Food Insecurity (11/13/2024)    Hunger Vital Sign     Worried About Running Out of Food in the Last Year: Never true     Ran Out of Food in the Last Year: Never true        Encounter Vitals  Blood Pressure : 122/76  Pulse: 64  Respiration: 16  Weight: 83.7 kg (184 lb 9.6 oz)  Height: 162.6 cm (5' 4\")  BMI (Calculated): 31.69  Pain Score: 5=Moderate Pain     ROS:  No fever, chills, nausea, vomiting, diarrhea, chest pain or shortness of breath. See HPI.    Physical Exam:  Constitutional: NAD  HENMT: NC/AT, PERRLA, EOMI, OP clear, TM's clear, no lymphadenopathy, no thyromegaly.  No JVD.  Cardiovascular: RRR, No m/r/g  Lungs: CTAB, no w/r/r  Extremities: 2+ DP, PT and Radial pulses bilaterally. No c/c/e  Skin: No legions notes  Neurologic: Alert & oriented x3, CN II-XII grossly intact    Assessment and Plan. The following treatment and monitoring plan is recommended:  HTN (hypertension)  Chronic, stable. The patient's blood pressure is well controlled with current medication. Continue with current defined treatment plan: hydrochlorothiazide (MICROZIDE) 12.5 MG capsule, losartan (COZAAR) 100 MG Tab. Follow-up at least annually.      Persistent headaches  Chronic, stable.  The patient reports that she gets headaches about once per month which resolve with medication.  Continue with current defined treatment plan: ibuprofen (MOTRIN) 800 MG Tab. Follow-up at least " annually.      Obesity (BMI 30-39.9)  Chronic, stable. BMI 31.69. Weight 184 lb. The patient reports eating a heart healthy diet and she walks regularly. Follow-up at least annually.      Aortic ectasia (HCC)  Chronic, stable. Noted on Echocardiogram on 10/3/2024.  Continue with current defined treatment plan: hydrochlorothiazide (MICROZIDE) 12.5 MG capsule.  Continue with blood pressure control.  Follow-up at least annually.       Atherosclerosis of aorta (HCC)  Chronic, stable. Noted on CT Chest ([place]) in 2016  Last lipid panel within normal limits on 10/3/24.  Continue with blood pressure control.  Follow-up at least annually.       Services suggested: No services needed at this time  Health Care Screening: Age-appropriate preventive services recommended by USPTF and ACIP covered by Medicare were discussed today. Services ordered if indicated and agreed upon by the patient.  Referrals offered: Community-based lifestyle interventions to reduce health risks and promote self-management and wellness, fall prevention, nutrition, physical activity, tobacco-use cessation, weight loss, and mental health services as per orders if indicated.    Discussion today about general wellness and lifestyle habits:    Prevent falls and reduce trip hazards; Cautioned about securing or removing rugs.  Have a working fire alarm and carbon monoxide detector.  Engage in regular physical activity and social activities.    Follow-up: Return for appointment with Primary Care Provider as needed.

## 2024-11-14 NOTE — ASSESSMENT & PLAN NOTE
Chronic, stable.  The patient reports that she gets headaches about once per month which resolve with medication.  Continue with current defined treatment plan: ibuprofen (MOTRIN) 800 MG Tab. Follow-up at least annually.

## 2024-11-14 NOTE — ASSESSMENT & PLAN NOTE
Chronic, stable. The patient's blood pressure is well controlled with current medication. Continue with current defined treatment plan: hydrochlorothiazide (MICROZIDE) 12.5 MG capsule, losartan (COZAAR) 100 MG Tab. Follow-up at least annually.

## 2024-11-14 NOTE — ASSESSMENT & PLAN NOTE
Chronic, stable. BMI 31.69. Weight 184 lb. The patient reports eating a heart healthy diet and she walks regularly. Follow-up at least annually.

## 2024-12-09 ENCOUNTER — HOSPITAL ENCOUNTER (OUTPATIENT)
Dept: RADIOLOGY | Facility: MEDICAL CENTER | Age: 69
End: 2024-12-09
Attending: SPECIALIST
Payer: MEDICARE

## 2024-12-09 DIAGNOSIS — R27.0 ATAXIA: ICD-10-CM

## 2024-12-09 PROCEDURE — 72141 MRI NECK SPINE W/O DYE: CPT

## 2024-12-31 ENCOUNTER — RESEARCH ENCOUNTER (OUTPATIENT)
Dept: RESEARCH | Facility: MEDICAL CENTER | Age: 69
End: 2024-12-31
Payer: MEDICARE

## 2024-12-31 ENCOUNTER — RESEARCH ENCOUNTER (OUTPATIENT)
Dept: LAB | Facility: MEDICAL CENTER | Age: 69
End: 2024-12-31
Payer: MEDICARE

## 2024-12-31 DIAGNOSIS — Z00.6 RESEARCH STUDY PATIENT: ICD-10-CM

## 2025-01-07 ENCOUNTER — HOSPITAL ENCOUNTER (OUTPATIENT)
Dept: LAB | Facility: MEDICAL CENTER | Age: 70
End: 2025-01-07
Attending: FAMILY MEDICINE
Payer: MEDICARE

## 2025-01-07 DIAGNOSIS — Z00.6 RESEARCH STUDY PATIENT: ICD-10-CM

## 2025-01-08 ENCOUNTER — OFFICE VISIT (OUTPATIENT)
Dept: NEUROLOGY | Facility: MEDICAL CENTER | Age: 70
End: 2025-01-08
Attending: SPECIALIST
Payer: MEDICARE

## 2025-01-08 VITALS
BODY MASS INDEX: 30.38 KG/M2 | SYSTOLIC BLOOD PRESSURE: 120 MMHG | DIASTOLIC BLOOD PRESSURE: 72 MMHG | OXYGEN SATURATION: 96 % | RESPIRATION RATE: 16 BRPM | HEART RATE: 91 BPM | TEMPERATURE: 97.4 F | WEIGHT: 177 LBS

## 2025-01-08 DIAGNOSIS — G45.9 TIA (TRANSIENT ISCHEMIC ATTACK): ICD-10-CM

## 2025-01-08 DIAGNOSIS — H93.13 TINNITUS OF BOTH EARS: ICD-10-CM

## 2025-01-08 PROCEDURE — 99214 OFFICE O/P EST MOD 30 MIN: CPT | Performed by: SPECIALIST

## 2025-01-08 PROCEDURE — 99212 OFFICE O/P EST SF 10 MIN: CPT | Performed by: SPECIALIST

## 2025-01-08 PROCEDURE — 3078F DIAST BP <80 MM HG: CPT | Performed by: SPECIALIST

## 2025-01-08 PROCEDURE — 3074F SYST BP LT 130 MM HG: CPT | Performed by: SPECIALIST

## 2025-01-08 ASSESSMENT — FIBROSIS 4 INDEX: FIB4 SCORE: 2.64

## 2025-01-08 ASSESSMENT — PATIENT HEALTH QUESTIONNAIRE - PHQ9: CLINICAL INTERPRETATION OF PHQ2 SCORE: 0

## 2025-01-09 NOTE — PROGRESS NOTES
Subjective     Giorgi WHITLOCK is a 69 y.o. female who presents with Follow-Up (TIA (transient ischemic attack)/)            HPI  Mrs. Giorgi Whitlock has done well since I saw her on October 10.  She had an episode on 2 October where she developed a feeling of subjective numbness on her left face was felt to possibly have flattening of left nasolabial fold.  Workup including an MRI CTA of the head and neck were both negative.  She was discharged on aspirin and continues aspirin.  She also takes blood pressure medications.  She has been asymptomatic and had no new episodes since she saw me in October.  Her main complaints is tinnitus.  She would like referral to ENT.  In addition I am seeing her back to make sure she sees her cardiologist as she has a PFO and is elected not to have it closed.    Past medical history:    1.  Episode of left arm heaviness and weakness June 2012 with a negative workup.    2.  Left facial numbness October of this year as above.    3.  Multiple episodes described previously right facial weakness felt to represent possible Bell's palsy    4.  History of hypertension    5.  Bilateral shoulder surgery    6.  Status post hysterectomy and appendectomy    Medications-aspirin 325 mg daily, Cozaar 100 mg daily, hydrochlorothiazide.    Allergies-codeine induced nausea    Social history-she is a non-smoker nondrinker, she is a realtor.    ROS  As above, her main complaint today is tinnitus         Objective     /72 (BP Location: Left arm, Patient Position: Sitting, BP Cuff Size: Adult)   Pulse 91   Temp 36.3 °C (97.4 °F) (Temporal)   Resp 16   Wt 80.3 kg (177 lb) Comment: pt eportd  SpO2 96%   BMI 30.38 kg/m²      Physical Exam  Patient is a cooperative woman appears her stated age.  Speech is fluent and mental status is grossly intact.    HEENT exam and noted to be normocephalic and atraumatic.  Pupils are equal round reactive.  EOMs are full visual fields are  full.  Her neck is supple.        Neurological Exam  She stands without difficulty.  Her gait is mildly wide-based.  She has no drift and no dysmetria.    Motor testing revealed intact bulk tone and strength throughout.    Sensory testing is intact to pin.    Reflexes are symmetric and normal active at the arms and knees absent at the ankles with downgoing toes.    Cranial nerves are unremarkable.           Assessment & Plan   Mrs. Whitlock is a 69-year-old woman who has had 2 episodes of transient neurologic dysfunction, 1 in October of this year with left-sided subjective numbness and another 1 in June 2012 where she developed subjective heaviness in her left arm.  Workup was negative for both.  She does have a PFO and is elected not to have a closed surgically.  She continues aspirin.  She is elected not to take a statin.  At this point she appears stable neurologically and I will see her back on a as needed basis.  She has an appointment to see her cardiologist later this month.     Assessment & Plan  TIA (transient ischemic attack)       She reports a history of longstanding tinnitus and uses noise abutment devices at night.  She request a referral to ENT and that is accomplished.  Tinnitus of both ears    Orders:    Referral to ENT

## 2025-01-09 NOTE — ASSESSMENT & PLAN NOTE
She reports a history of longstanding tinnitus and uses noise abutment devices at night.  She request a referral to ENT and that is accomplished.

## 2025-01-10 LAB
ELF SCORE: 9.78 -
HA (HYALURONIC ACID): 122.16 NG/ML
PIIINP (AMINO-TERMINAL PROPEPTIDE): 5.75 NG/ML
RELATIVE RISK: NORMAL
RISK GROUP: NORMAL
RISK: 3.3 %
TIMP-1 (TISSUE INHIBITOR OF MMP1): 205.2 NG/ML

## 2025-01-19 LAB
APOB+LDLR+PCSK9 GENE MUT ANL BLD/T: NOT DETECTED
BRCA1+BRCA2 DEL+DUP + FULL MUT ANL BLD/T: NOT DETECTED
MLH1+MSH2+MSH6+PMS2 GN DEL+DUP+FUL M: NOT DETECTED